# Patient Record
Sex: FEMALE | Race: WHITE | Employment: FULL TIME | URBAN - METROPOLITAN AREA
[De-identification: names, ages, dates, MRNs, and addresses within clinical notes are randomized per-mention and may not be internally consistent; named-entity substitution may affect disease eponyms.]

---

## 2017-01-03 ENCOUNTER — TELEPHONE (OUTPATIENT)
Dept: ENDOCRINOLOGY | Age: 47
End: 2017-01-03

## 2017-01-26 ENCOUNTER — OFFICE VISIT (OUTPATIENT)
Dept: INTERNAL MEDICINE CLINIC | Age: 47
End: 2017-01-26

## 2017-01-26 VITALS
RESPIRATION RATE: 16 BRPM | HEART RATE: 88 BPM | HEIGHT: 65 IN | BODY MASS INDEX: 33.15 KG/M2 | OXYGEN SATURATION: 98 % | DIASTOLIC BLOOD PRESSURE: 72 MMHG | SYSTOLIC BLOOD PRESSURE: 114 MMHG | WEIGHT: 199 LBS

## 2017-01-26 LAB
ANION GAP SERPL CALCULATED.3IONS-SCNC: 19 MMOL/L (ref 3–16)
BUN BLDV-MCNC: 13 MG/DL (ref 7–20)
CALCIUM SERPL-MCNC: 9.2 MG/DL (ref 8.3–10.6)
CHLORIDE BLD-SCNC: 99 MMOL/L (ref 99–110)
CO2: 23 MMOL/L (ref 21–32)
CREAT SERPL-MCNC: 0.6 MG/DL (ref 0.6–1.1)
CREATININE URINE: 135.2 MG/DL (ref 28–259)
GFR AFRICAN AMERICAN: >60
GFR NON-AFRICAN AMERICAN: >60
GLUCOSE BLD-MCNC: 114 MG/DL (ref 70–99)
HBA1C MFR BLD: 6.5 %
MICROALBUMIN UR-MCNC: <1.2 MG/DL
MICROALBUMIN/CREAT UR-RTO: NORMAL MG/G (ref 0–30)
POTASSIUM SERPL-SCNC: 4.3 MMOL/L (ref 3.5–5.1)
SODIUM BLD-SCNC: 141 MMOL/L (ref 136–145)

## 2017-01-26 PROCEDURE — 83036 HEMOGLOBIN GLYCOSYLATED A1C: CPT | Performed by: INTERNAL MEDICINE

## 2017-01-26 PROCEDURE — 90686 IIV4 VACC NO PRSV 0.5 ML IM: CPT | Performed by: INTERNAL MEDICINE

## 2017-01-26 PROCEDURE — 36415 COLL VENOUS BLD VENIPUNCTURE: CPT | Performed by: INTERNAL MEDICINE

## 2017-01-26 PROCEDURE — 99213 OFFICE O/P EST LOW 20 MIN: CPT | Performed by: INTERNAL MEDICINE

## 2017-01-26 PROCEDURE — 90471 IMMUNIZATION ADMIN: CPT | Performed by: INTERNAL MEDICINE

## 2017-01-26 ASSESSMENT — PATIENT HEALTH QUESTIONNAIRE - PHQ9
1. LITTLE INTEREST OR PLEASURE IN DOING THINGS: 0
SUM OF ALL RESPONSES TO PHQ QUESTIONS 1-9: 0
SUM OF ALL RESPONSES TO PHQ9 QUESTIONS 1 & 2: 0
2. FEELING DOWN, DEPRESSED OR HOPELESS: 0

## 2017-01-26 ASSESSMENT — ENCOUNTER SYMPTOMS
COUGH: 0
VISUAL CHANGE: 0
BLURRED VISION: 0
SHORTNESS OF BREATH: 0

## 2017-02-20 RX ORDER — METFORMIN HYDROCHLORIDE 500 MG/1
TABLET, EXTENDED RELEASE ORAL
Qty: 120 TABLET | Refills: 3 | Status: SHIPPED | OUTPATIENT
Start: 2017-02-20 | End: 2017-06-27 | Stop reason: SDUPTHER

## 2017-02-20 RX ORDER — EMPAGLIFLOZIN 10 MG/1
TABLET, FILM COATED ORAL
Qty: 30 TABLET | Refills: 3 | Status: SHIPPED | OUTPATIENT
Start: 2017-02-20 | End: 2017-06-27 | Stop reason: SDUPTHER

## 2017-04-13 ENCOUNTER — OFFICE VISIT (OUTPATIENT)
Dept: ENDOCRINOLOGY | Age: 47
End: 2017-04-13

## 2017-04-13 VITALS
HEIGHT: 64 IN | SYSTOLIC BLOOD PRESSURE: 122 MMHG | OXYGEN SATURATION: 97 % | HEART RATE: 94 BPM | BODY MASS INDEX: 33.29 KG/M2 | RESPIRATION RATE: 16 BRPM | DIASTOLIC BLOOD PRESSURE: 83 MMHG | WEIGHT: 195 LBS

## 2017-04-13 DIAGNOSIS — G62.9 NEUROPATHY: ICD-10-CM

## 2017-04-13 LAB — HBA1C MFR BLD: 6.2 %

## 2017-04-13 PROCEDURE — 83036 HEMOGLOBIN GLYCOSYLATED A1C: CPT | Performed by: INTERNAL MEDICINE

## 2017-04-13 PROCEDURE — 99214 OFFICE O/P EST MOD 30 MIN: CPT | Performed by: INTERNAL MEDICINE

## 2017-05-01 RX ORDER — INSULIN DEGLUDEC INJECTION 100 U/ML
INJECTION, SOLUTION SUBCUTANEOUS
Qty: 6 PEN | Refills: 3 | Status: SHIPPED | OUTPATIENT
Start: 2017-05-01 | End: 2017-07-27 | Stop reason: SDUPTHER

## 2017-06-27 RX ORDER — EMPAGLIFLOZIN 10 MG/1
TABLET, FILM COATED ORAL
Qty: 30 TABLET | Refills: 3 | Status: SHIPPED | OUTPATIENT
Start: 2017-06-27 | End: 2017-11-08 | Stop reason: SDUPTHER

## 2017-06-27 RX ORDER — METFORMIN HYDROCHLORIDE 500 MG/1
TABLET, EXTENDED RELEASE ORAL
Qty: 120 TABLET | Refills: 3 | Status: SHIPPED | OUTPATIENT
Start: 2017-06-27 | End: 2017-11-08 | Stop reason: SDUPTHER

## 2017-07-27 ENCOUNTER — OFFICE VISIT (OUTPATIENT)
Dept: INTERNAL MEDICINE CLINIC | Age: 47
End: 2017-07-27

## 2017-07-27 VITALS
SYSTOLIC BLOOD PRESSURE: 118 MMHG | RESPIRATION RATE: 16 BRPM | BODY MASS INDEX: 34.15 KG/M2 | HEIGHT: 64 IN | HEART RATE: 80 BPM | WEIGHT: 200 LBS | DIASTOLIC BLOOD PRESSURE: 76 MMHG

## 2017-07-27 LAB
ANION GAP SERPL CALCULATED.3IONS-SCNC: 17 MMOL/L (ref 3–16)
BUN BLDV-MCNC: 13 MG/DL (ref 7–20)
CALCIUM SERPL-MCNC: 9.4 MG/DL (ref 8.3–10.6)
CHLORIDE BLD-SCNC: 99 MMOL/L (ref 99–110)
CHOLESTEROL, TOTAL: 143 MG/DL (ref 0–199)
CO2: 24 MMOL/L (ref 21–32)
CREAT SERPL-MCNC: <0.5 MG/DL (ref 0.6–1.1)
GFR AFRICAN AMERICAN: >60
GFR NON-AFRICAN AMERICAN: >60
GLUCOSE BLD-MCNC: 104 MG/DL (ref 70–99)
HBA1C MFR BLD: 6.6 %
HDLC SERPL-MCNC: 51 MG/DL (ref 40–60)
LDL CHOLESTEROL CALCULATED: 61 MG/DL
POTASSIUM SERPL-SCNC: 4.2 MMOL/L (ref 3.5–5.1)
SODIUM BLD-SCNC: 140 MMOL/L (ref 136–145)
TRIGL SERPL-MCNC: 153 MG/DL (ref 0–150)
VLDLC SERPL CALC-MCNC: 31 MG/DL

## 2017-07-27 PROCEDURE — 83036 HEMOGLOBIN GLYCOSYLATED A1C: CPT | Performed by: INTERNAL MEDICINE

## 2017-07-27 PROCEDURE — 99213 OFFICE O/P EST LOW 20 MIN: CPT | Performed by: INTERNAL MEDICINE

## 2017-07-27 PROCEDURE — 36415 COLL VENOUS BLD VENIPUNCTURE: CPT | Performed by: INTERNAL MEDICINE

## 2017-07-27 ASSESSMENT — ENCOUNTER SYMPTOMS
VISUAL CHANGE: 0
COUGH: 0
BLURRED VISION: 0
SHORTNESS OF BREATH: 0

## 2017-08-14 ENCOUNTER — TELEPHONE (OUTPATIENT)
Dept: INTERNAL MEDICINE CLINIC | Age: 47
End: 2017-08-14

## 2017-10-02 ENCOUNTER — HOSPITAL ENCOUNTER (OUTPATIENT)
Dept: WOMENS IMAGING | Age: 47
Discharge: HOME | End: 2017-10-02
Attending: INTERNAL MEDICINE | Admitting: INTERNAL MEDICINE

## 2017-10-02 DIAGNOSIS — Z12.31 VISIT FOR SCREENING MAMMOGRAM: ICD-10-CM

## 2017-11-08 RX ORDER — EMPAGLIFLOZIN 10 MG/1
TABLET, FILM COATED ORAL
Qty: 30 TABLET | Refills: 0 | Status: SHIPPED | OUTPATIENT
Start: 2017-11-08 | End: 2017-12-06 | Stop reason: SDUPTHER

## 2017-11-08 RX ORDER — METFORMIN HYDROCHLORIDE 500 MG/1
TABLET, EXTENDED RELEASE ORAL
Qty: 120 TABLET | Refills: 0 | Status: SHIPPED | OUTPATIENT
Start: 2017-11-08 | End: 2017-12-06 | Stop reason: SDUPTHER

## 2017-11-20 ENCOUNTER — OFFICE VISIT (OUTPATIENT)
Dept: INTERNAL MEDICINE CLINIC | Age: 47
End: 2017-11-20

## 2017-11-20 VITALS
BODY MASS INDEX: 33.97 KG/M2 | HEART RATE: 68 BPM | SYSTOLIC BLOOD PRESSURE: 100 MMHG | RESPIRATION RATE: 16 BRPM | DIASTOLIC BLOOD PRESSURE: 70 MMHG | HEIGHT: 64 IN | WEIGHT: 199 LBS

## 2017-11-20 LAB — HBA1C MFR BLD: 6.6 %

## 2017-11-20 PROCEDURE — 90472 IMMUNIZATION ADMIN EACH ADD: CPT | Performed by: INTERNAL MEDICINE

## 2017-11-20 PROCEDURE — 90471 IMMUNIZATION ADMIN: CPT | Performed by: INTERNAL MEDICINE

## 2017-11-20 PROCEDURE — 83036 HEMOGLOBIN GLYCOSYLATED A1C: CPT | Performed by: INTERNAL MEDICINE

## 2017-11-20 PROCEDURE — 90686 IIV4 VACC NO PRSV 0.5 ML IM: CPT | Performed by: INTERNAL MEDICINE

## 2017-11-20 PROCEDURE — 90715 TDAP VACCINE 7 YRS/> IM: CPT | Performed by: INTERNAL MEDICINE

## 2017-11-20 PROCEDURE — 99213 OFFICE O/P EST LOW 20 MIN: CPT | Performed by: INTERNAL MEDICINE

## 2017-11-20 ASSESSMENT — ENCOUNTER SYMPTOMS
VISUAL CHANGE: 0
BLURRED VISION: 0
SHORTNESS OF BREATH: 0
COUGH: 0

## 2017-11-20 NOTE — PROGRESS NOTES
Vaccine Information Sheet, \"Influenza - Inactivated\"  given to Carolee Louise, or parent/legal guardian of  Carolee Louise and verbalized understanding. Patient responses:    Have you ever had a reaction to a flu vaccine? No  Are you able to eat eggs without adverse effects? Yes  Do you have any current illness? No  Have you ever had Guillian Olympia Syndrome? No    Flu vaccine given per order. Please see immunization tab.

## 2017-11-20 NOTE — PROGRESS NOTES
Neurological: Negative for syncope, weakness and light-headedness. Prior to Visit Medications    Medication Sig Taking? Authorizing Provider   JARDIANCE 10 MG tablet TAKE 1 TABLET BY MOUTH DAILY Yes Ac Montilla MD   metFORMIN (GLUCOPHAGE-XR) 500 MG extended release tablet TAKE 2 TABLETS BY MOUTH BEFORE BREAKFAST AND DINNER Yes Ac Montilla MD   Insulin Degludec (TRESIBA FLEXTOUCH) 100 UNIT/ML SOPN INJECT 44 UNITS PER DAY Yes Patsy Medellin MD   BD ULTRA-FINE PEN NEEDLES 29G X 12.7MM MISC TEST TWICE DAILY Yes Patsy Medellin MD   lisinopril (PRINIVIL;ZESTRIL) 10 MG tablet TAKE 1 TABLET BY MOUTH DAILY Yes Patsy Medellin MD   Multiple Vitamins-Minerals (MULTIPLE VITAMINS/WOMENS PO) Take  by mouth daily. Yes Historical Provider, MD   aspirin 81 MG tablet Take 81 mg by mouth daily. Yes Historical Provider, MD   glucose blood VI test strips (TRUETEST TEST) strip Pt tests bid Yes Patsy Medellin MD     /70 (Site: Right Arm, Position: Sitting, Cuff Size: Medium Adult)   Pulse 68   Resp 16   Ht 5' 4\" (1.626 m)   Wt 199 lb (90.3 kg)   BMI 34.16 kg/m²       Objective:   Physical Exam   Constitutional: She appears well-developed and well-nourished. No distress. Neck: No JVD present. No bruits   Cardiovascular: Normal rate, regular rhythm and normal heart sounds. Exam reveals no gallop and no friction rub. No murmur heard. Pulmonary/Chest: Effort normal and breath sounds normal. No respiratory distress. She has no wheezes. She has no rales. Abdominal: She exhibits no distension. There is no tenderness. There is no CVA tenderness. Musculoskeletal: She exhibits no edema. Skin: Skin is warm and dry. She is not diaphoretic. No erythema. Vitals reviewed. Assessment:      1. Diabetes mellitus, insulin dependent (IDDM), controlled ([facility] [address]            Plan:      Domingo Horner was seen today for diabetes.     Diagnoses and all orders for this visit:    Diabetes mellitus, insulin dependent (IDDM), controlled ([facility] [address]  -     POCT glycosylated hemoglobin (Hb A1C)    Other orders  -     INFLUENZA, QUADV, 3 YRS AND OLDER, IM, PF, PREFILL SYR OR SDV, 0.5ML (FLUZONE QUADV, PF)  -     Tdap (age 10y-63y) IM (ADACEL)        6 months

## 2017-12-07 RX ORDER — EMPAGLIFLOZIN 10 MG/1
TABLET, FILM COATED ORAL
Qty: 30 TABLET | Refills: 0 | Status: SHIPPED | OUTPATIENT
Start: 2017-12-07 | End: 2017-12-27

## 2017-12-07 RX ORDER — METFORMIN HYDROCHLORIDE 500 MG/1
TABLET, EXTENDED RELEASE ORAL
Qty: 120 TABLET | Refills: 0 | Status: SHIPPED | OUTPATIENT
Start: 2017-12-07 | End: 2017-12-30 | Stop reason: SDUPTHER

## 2018-01-02 RX ORDER — METFORMIN HYDROCHLORIDE 500 MG/1
TABLET, EXTENDED RELEASE ORAL
Qty: 120 TABLET | Refills: 3 | Status: SHIPPED | OUTPATIENT
Start: 2018-01-02 | End: 2018-05-12 | Stop reason: SDUPTHER

## 2018-01-02 RX ORDER — EMPAGLIFLOZIN 10 MG/1
TABLET, FILM COATED ORAL
Qty: 30 TABLET | Refills: 3 | Status: SHIPPED | OUTPATIENT
Start: 2018-01-02 | End: 2018-01-10

## 2018-01-10 ENCOUNTER — OFFICE VISIT (OUTPATIENT)
Dept: ENDOCRINOLOGY | Age: 48
End: 2018-01-10

## 2018-01-10 VITALS
HEART RATE: 117 BPM | DIASTOLIC BLOOD PRESSURE: 79 MMHG | RESPIRATION RATE: 16 BRPM | BODY MASS INDEX: 33.67 KG/M2 | HEIGHT: 64 IN | WEIGHT: 197.2 LBS | SYSTOLIC BLOOD PRESSURE: 114 MMHG | OXYGEN SATURATION: 99 %

## 2018-01-10 DIAGNOSIS — G62.9 NEUROPATHY: ICD-10-CM

## 2018-01-10 LAB
CREATININE URINE: 66.9 MG/DL (ref 28–259)
HBA1C MFR BLD: 6.3 %
MICROALBUMIN UR-MCNC: <1.2 MG/DL
MICROALBUMIN/CREAT UR-RTO: NORMAL MG/G (ref 0–30)

## 2018-01-10 PROCEDURE — 83036 HEMOGLOBIN GLYCOSYLATED A1C: CPT | Performed by: INTERNAL MEDICINE

## 2018-01-10 PROCEDURE — 99214 OFFICE O/P EST MOD 30 MIN: CPT | Performed by: INTERNAL MEDICINE

## 2018-01-16 RX ORDER — INSULIN DEGLUDEC INJECTION 100 U/ML
INJECTION, SOLUTION SUBCUTANEOUS
Qty: 1 PEN | Refills: 12 | Status: SHIPPED | OUTPATIENT
Start: 2018-01-16 | End: 2019-02-11 | Stop reason: SDUPTHER

## 2018-03-12 RX ORDER — LISINOPRIL 10 MG/1
TABLET ORAL
Qty: 30 TABLET | Refills: 12 | Status: SHIPPED | OUTPATIENT
Start: 2018-03-12 | End: 2019-03-25 | Stop reason: SDUPTHER

## 2018-04-10 ENCOUNTER — PATIENT MESSAGE (OUTPATIENT)
Dept: INTERNAL MEDICINE CLINIC | Age: 48
End: 2018-04-10

## 2018-05-14 RX ORDER — METFORMIN HYDROCHLORIDE 500 MG/1
TABLET, EXTENDED RELEASE ORAL
Qty: 120 TABLET | Refills: 3 | Status: SHIPPED | OUTPATIENT
Start: 2018-05-14 | End: 2018-10-11 | Stop reason: SDUPTHER

## 2018-05-21 ENCOUNTER — OFFICE VISIT (OUTPATIENT)
Dept: INTERNAL MEDICINE CLINIC | Age: 48
End: 2018-05-21

## 2018-05-21 VITALS
RESPIRATION RATE: 16 BRPM | WEIGHT: 200 LBS | DIASTOLIC BLOOD PRESSURE: 74 MMHG | HEIGHT: 64 IN | HEART RATE: 80 BPM | SYSTOLIC BLOOD PRESSURE: 108 MMHG | BODY MASS INDEX: 34.15 KG/M2

## 2018-05-21 LAB
ANION GAP SERPL CALCULATED.3IONS-SCNC: 15 MMOL/L (ref 3–16)
BUN BLDV-MCNC: 10 MG/DL (ref 7–20)
CALCIUM SERPL-MCNC: 9 MG/DL (ref 8.3–10.6)
CHLORIDE BLD-SCNC: 100 MMOL/L (ref 99–110)
CHOLESTEROL, TOTAL: 143 MG/DL (ref 0–199)
CO2: 24 MMOL/L (ref 21–32)
CREAT SERPL-MCNC: <0.5 MG/DL (ref 0.6–1.1)
GFR AFRICAN AMERICAN: >60
GFR NON-AFRICAN AMERICAN: >60
GLUCOSE BLD-MCNC: 128 MG/DL (ref 70–99)
HBA1C MFR BLD: 6.6 %
HDLC SERPL-MCNC: 45 MG/DL (ref 40–60)
LDL CHOLESTEROL CALCULATED: 66 MG/DL
POTASSIUM SERPL-SCNC: 4.5 MMOL/L (ref 3.5–5.1)
SODIUM BLD-SCNC: 139 MMOL/L (ref 136–145)
TRIGL SERPL-MCNC: 161 MG/DL (ref 0–150)
TSH REFLEX: 1.77 UIU/ML (ref 0.27–4.2)
VLDLC SERPL CALC-MCNC: 32 MG/DL

## 2018-05-21 PROCEDURE — 36415 COLL VENOUS BLD VENIPUNCTURE: CPT | Performed by: INTERNAL MEDICINE

## 2018-05-21 PROCEDURE — 99214 OFFICE O/P EST MOD 30 MIN: CPT | Performed by: INTERNAL MEDICINE

## 2018-05-21 PROCEDURE — 83036 HEMOGLOBIN GLYCOSYLATED A1C: CPT | Performed by: INTERNAL MEDICINE

## 2018-05-21 ASSESSMENT — ENCOUNTER SYMPTOMS
VISUAL CHANGE: 0
BLURRED VISION: 0
COUGH: 0
SHORTNESS OF BREATH: 0

## 2018-08-16 ENCOUNTER — OFFICE VISIT (OUTPATIENT)
Dept: ENDOCRINOLOGY | Age: 48
End: 2018-08-16

## 2018-08-16 VITALS
HEIGHT: 64 IN | DIASTOLIC BLOOD PRESSURE: 68 MMHG | BODY MASS INDEX: 33.29 KG/M2 | WEIGHT: 195 LBS | SYSTOLIC BLOOD PRESSURE: 116 MMHG

## 2018-08-16 DIAGNOSIS — G62.9 NEUROPATHY: ICD-10-CM

## 2018-08-16 DIAGNOSIS — E11.9 DIABETES MELLITUS WITHOUT COMPLICATION (HCC): Primary | ICD-10-CM

## 2018-08-16 LAB — HBA1C MFR BLD: 6.4 %

## 2018-08-16 PROCEDURE — 99214 OFFICE O/P EST MOD 30 MIN: CPT | Performed by: INTERNAL MEDICINE

## 2018-08-16 PROCEDURE — 83036 HEMOGLOBIN GLYCOSYLATED A1C: CPT | Performed by: INTERNAL MEDICINE

## 2018-08-16 NOTE — PROGRESS NOTES
Seen as f/u patient for diabetes    Interim: On tresiba now due to insurance    Diagnosed with Type 2 diabetes mellitus in  2010  Known diabetic complications: neuropathy    Current diabetic medications   Metformin ER 2 BID  Tresiba 44 units    [place]    She has tried Larisa Lemon in the past.    Intolerance to diabetes medications: yes - Metformin plain form    Last A1c 6.4%<----- 6.3%<---- 6.2% on 4/17<---- 6.4 on 4/16<------5.8 on 9/15<---- 5.7 on 3/15<---6.0 on 8/14<-----6.5 on 2/14<---8.0 on 11/13<---9.5 on 7/13<--- 9.0<--- 6.5    Prior visit with dietician: Yes   Current diet: on average, 3 meals per day   Current exercise: walking   Current monitoring regimen: home blood tests -occ    Has brought blood glucose log/meter: no  Home blood sugar records: 100s  Any episodes of hypoglycemia? occ    No Hx of CAD , PVD, CVA    Hyperlipidemia: LDL 68 o n 7/13---> LDL 66 on 5/18  Last eye exam:6/17  Last foot exam: 8/18  Last microalbumin to creatinine ratio: 1/18  ACE-I or ARB: Lisinopril 10mg    Neuropathy improved, stable    Review of Systems  scanned  Reviewed  5lb loss     Objective:        /68 (Site: Right Arm, Position: Sitting, Cuff Size: Medium Adult)   Ht 5' 4\" (1.626 m)   Wt 195 lb (88.5 kg)   LMP 08/04/2018   Breastfeeding? No   BMI 33.47 kg/m²   Wt Readings from Last 3 Encounters:   08/16/18 195 lb (88.5 kg)   05/21/18 200 lb (90.7 kg)   01/10/18 197 lb 3.2 oz (89.4 kg)     Constitutional: Well-developed, appears stated age, cooperative, in no acute distress  H/E/N/M/T:atraumatic, normocephalic, external ears, nose, lips normal without lesions  Eyes: Lids, lashes, conjunctivae and sclerae normal, No proptosis, no redness  Neck: supple, symmetrical, no swelling  Skin: No obvious rashes or lesions present.   Skin and hair texture normal  Psychiatric: Judgement and Insight:  judgement and insight appear normal  Neuro: Normal without focal findings, speech is normal normal, speech is

## 2018-09-04 RX ORDER — DAPAGLIFLOZIN 5 MG/1
5 TABLET, FILM COATED ORAL EVERY MORNING
Qty: 30 TABLET | Refills: 3 | Status: SHIPPED | OUTPATIENT
Start: 2018-09-04 | End: 2019-01-16 | Stop reason: SDUPTHER

## 2018-10-11 RX ORDER — METFORMIN HYDROCHLORIDE 500 MG/1
TABLET, EXTENDED RELEASE ORAL
Qty: 120 TABLET | Refills: 3 | Status: SHIPPED | OUTPATIENT
Start: 2018-10-11 | End: 2019-03-13 | Stop reason: SDUPTHER

## 2018-11-26 ENCOUNTER — OFFICE VISIT (OUTPATIENT)
Dept: INTERNAL MEDICINE CLINIC | Age: 48
End: 2018-11-26
Payer: COMMERCIAL

## 2018-11-26 VITALS
RESPIRATION RATE: 16 BRPM | SYSTOLIC BLOOD PRESSURE: 118 MMHG | DIASTOLIC BLOOD PRESSURE: 74 MMHG | HEART RATE: 72 BPM | BODY MASS INDEX: 33.63 KG/M2 | HEIGHT: 64 IN | WEIGHT: 197 LBS

## 2018-11-26 LAB
A/G RATIO: 1.7 (ref 1.1–2.2)
ALBUMIN SERPL-MCNC: 4.3 G/DL (ref 3.4–5)
ALP BLD-CCNC: 75 U/L (ref 40–129)
ALT SERPL-CCNC: 31 U/L (ref 10–40)
ANION GAP SERPL CALCULATED.3IONS-SCNC: 12 MMOL/L (ref 3–16)
AST SERPL-CCNC: 20 U/L (ref 15–37)
BILIRUB SERPL-MCNC: 0.4 MG/DL (ref 0–1)
BUN BLDV-MCNC: 10 MG/DL (ref 7–20)
CALCIUM SERPL-MCNC: 9.6 MG/DL (ref 8.3–10.6)
CHLORIDE BLD-SCNC: 103 MMOL/L (ref 99–110)
CHOLESTEROL, TOTAL: 145 MG/DL (ref 0–199)
CO2: 23 MMOL/L (ref 21–32)
CREAT SERPL-MCNC: <0.5 MG/DL (ref 0.6–1.1)
GFR AFRICAN AMERICAN: >60
GFR NON-AFRICAN AMERICAN: >60
GLOBULIN: 2.5 G/DL
GLUCOSE BLD-MCNC: 122 MG/DL (ref 70–99)
HBA1C MFR BLD: 6.9 %
HDLC SERPL-MCNC: 49 MG/DL (ref 40–60)
LDL CHOLESTEROL CALCULATED: 66 MG/DL
POTASSIUM SERPL-SCNC: 4.1 MMOL/L (ref 3.5–5.1)
SODIUM BLD-SCNC: 138 MMOL/L (ref 136–145)
TOTAL PROTEIN: 6.8 G/DL (ref 6.4–8.2)
TRIGL SERPL-MCNC: 150 MG/DL (ref 0–150)
VLDLC SERPL CALC-MCNC: 30 MG/DL

## 2018-11-26 PROCEDURE — 36415 COLL VENOUS BLD VENIPUNCTURE: CPT | Performed by: INTERNAL MEDICINE

## 2018-11-26 PROCEDURE — 99213 OFFICE O/P EST LOW 20 MIN: CPT | Performed by: INTERNAL MEDICINE

## 2018-11-26 PROCEDURE — 90471 IMMUNIZATION ADMIN: CPT | Performed by: INTERNAL MEDICINE

## 2018-11-26 PROCEDURE — 83036 HEMOGLOBIN GLYCOSYLATED A1C: CPT | Performed by: INTERNAL MEDICINE

## 2018-11-26 PROCEDURE — 90686 IIV4 VACC NO PRSV 0.5 ML IM: CPT | Performed by: INTERNAL MEDICINE

## 2018-11-26 ASSESSMENT — PATIENT HEALTH QUESTIONNAIRE - PHQ9
SUM OF ALL RESPONSES TO PHQ QUESTIONS 1-9: 0
1. LITTLE INTEREST OR PLEASURE IN DOING THINGS: 0
2. FEELING DOWN, DEPRESSED OR HOPELESS: 0
SUM OF ALL RESPONSES TO PHQ9 QUESTIONS 1 & 2: 0
SUM OF ALL RESPONSES TO PHQ QUESTIONS 1-9: 0

## 2018-11-26 ASSESSMENT — ENCOUNTER SYMPTOMS
COUGH: 0
SHORTNESS OF BREATH: 0
BLURRED VISION: 0
VISUAL CHANGE: 0

## 2018-11-26 NOTE — PROGRESS NOTES
Subjective:    cc:  Dm f/u  Patient ID: Laurent Irving is a 53 y.o. female. Diabetes   She presents for her follow-up diabetic visit. She has type 2 diabetes mellitus. No MedicAlert identification noted. Her disease course has been stable (endocrinologist cut back on lantus dose as last a1c was just 6). There are no hypoglycemic associated symptoms. There are no diabetic associated symptoms. Pertinent negatives for diabetes include no blurred vision, no chest pain, no fatigue, no foot ulcerations, no polydipsia, no polyphagia, no polyuria, no visual change, no weakness and no weight loss. There are no hypoglycemic complications. Symptoms are stable. There are no diabetic complications. Risk factors for coronary artery disease include diabetes mellitus. Current diabetic treatment includes diet, insulin injections, intensive insulin program and oral agent (dual therapy). She is compliant with treatment all of the time. She is currently taking insulin at bedtime and pre-breakfast. Insulin injections are given by patient. Her weight is fluctuating minimally. She is following a generally healthy diet. Meal planning includes carbohydrate counting. She has had a previous visit with a dietitian. She participates in exercise intermittently. There is no compliance with monitoring of blood glucose. An ACE inhibitor/angiotensin II receptor blocker is being taken. She does not see a podiatrist.Eye exam is current. seeing endocrine q6 months. Alternating between me and him. Dm has been under good control. Last a1c 6.4. Minimal exercise but active at work. Review of Systems   Constitutional: Negative for fatigue, unexpected weight change and weight loss. Eyes: Negative for blurred vision. Respiratory: Negative for cough and shortness of breath. Cardiovascular: Negative for chest pain, palpitations and leg swelling. Endocrine: Negative for polydipsia, polyphagia and polyuria.    Neurological:

## 2019-01-16 RX ORDER — DAPAGLIFLOZIN 5 MG/1
5 TABLET, FILM COATED ORAL EVERY MORNING
Qty: 30 TABLET | Refills: 3 | Status: SHIPPED | OUTPATIENT
Start: 2019-01-16 | End: 2019-05-21 | Stop reason: SDUPTHER

## 2019-01-31 ENCOUNTER — OFFICE VISIT (OUTPATIENT)
Dept: INTERNAL MEDICINE CLINIC | Age: 49
End: 2019-01-31
Payer: COMMERCIAL

## 2019-01-31 ENCOUNTER — PATIENT MESSAGE (OUTPATIENT)
Dept: INTERNAL MEDICINE CLINIC | Age: 49
End: 2019-01-31

## 2019-01-31 VITALS
HEART RATE: 124 BPM | HEIGHT: 64 IN | DIASTOLIC BLOOD PRESSURE: 80 MMHG | TEMPERATURE: 98.7 F | OXYGEN SATURATION: 98 % | WEIGHT: 194 LBS | SYSTOLIC BLOOD PRESSURE: 120 MMHG | BODY MASS INDEX: 33.12 KG/M2 | RESPIRATION RATE: 14 BRPM

## 2019-01-31 DIAGNOSIS — J18.9 COMMUNITY ACQUIRED PNEUMONIA, UNSPECIFIED LATERALITY: Primary | ICD-10-CM

## 2019-01-31 DIAGNOSIS — R05.9 COUGH: ICD-10-CM

## 2019-01-31 PROCEDURE — 99213 OFFICE O/P EST LOW 20 MIN: CPT | Performed by: NURSE PRACTITIONER

## 2019-01-31 RX ORDER — BENZONATATE 100 MG/1
100 CAPSULE ORAL 3 TIMES DAILY PRN
Qty: 20 CAPSULE | Refills: 0 | Status: SHIPPED | OUTPATIENT
Start: 2019-01-31 | End: 2019-01-31 | Stop reason: CLARIF

## 2019-01-31 RX ORDER — BENZONATATE 100 MG/1
100 CAPSULE ORAL 3 TIMES DAILY PRN
Qty: 20 CAPSULE | Refills: 0 | Status: SHIPPED | OUTPATIENT
Start: 2019-01-31 | End: 2019-02-07

## 2019-01-31 RX ORDER — AZITHROMYCIN 250 MG/1
TABLET, FILM COATED ORAL
Refills: 0 | COMMUNITY
Start: 2019-01-29 | End: 2019-01-31

## 2019-01-31 RX ORDER — DEXTROMETHORPHAN HYDROBROMIDE AND PROMETHAZINE HYDROCHLORIDE 15; 6.25 MG/5ML; MG/5ML
SYRUP ORAL
Refills: 0 | COMMUNITY
Start: 2019-01-29 | End: 2019-04-16

## 2019-01-31 RX ORDER — LEVOFLOXACIN 500 MG/1
500 TABLET, FILM COATED ORAL DAILY
Qty: 10 TABLET | Refills: 0 | Status: SHIPPED | OUTPATIENT
Start: 2019-01-31 | End: 2019-02-10

## 2019-01-31 ASSESSMENT — ENCOUNTER SYMPTOMS
ANAL BLEEDING: 0
SORE THROAT: 1
BACK PAIN: 0
CHOKING: 0
SINUS PAIN: 1
SINUS PRESSURE: 1
FREQUENT THROAT CLEARING: 0
HEMOPTYSIS: 0
CHEST TIGHTNESS: 0
TROUBLE SWALLOWING: 0
STRIDOR: 0
VOMITING: 0
ABDOMINAL PAIN: 0
SHORTNESS OF BREATH: 0
COUGH: 1
DIARRHEA: 0
HOARSE VOICE: 0
NAUSEA: 0
ABDOMINAL DISTENTION: 0
SPUTUM PRODUCTION: 1
HEARTBURN: 0
BLOOD IN STOOL: 0
WHEEZING: 0
CONSTIPATION: 0
DIFFICULTY BREATHING: 1
RHINORRHEA: 1

## 2019-02-12 RX ORDER — INSULIN DEGLUDEC INJECTION 100 U/ML
INJECTION, SOLUTION SUBCUTANEOUS
Qty: 15 PEN | Refills: 12 | Status: SHIPPED | OUTPATIENT
Start: 2019-02-12 | End: 2019-07-22 | Stop reason: SDUPTHER

## 2019-02-21 ENCOUNTER — OFFICE VISIT (OUTPATIENT)
Dept: ENDOCRINOLOGY | Age: 49
End: 2019-02-21
Payer: COMMERCIAL

## 2019-02-21 VITALS
DIASTOLIC BLOOD PRESSURE: 80 MMHG | HEIGHT: 64 IN | WEIGHT: 192.8 LBS | SYSTOLIC BLOOD PRESSURE: 112 MMHG | BODY MASS INDEX: 32.91 KG/M2

## 2019-02-21 LAB
CREATININE URINE: 185.5 MG/DL (ref 28–259)
HBA1C MFR BLD: 6.6 %
MICROALBUMIN UR-MCNC: <1.2 MG/DL
MICROALBUMIN/CREAT UR-RTO: NORMAL MG/G (ref 0–30)

## 2019-02-21 PROCEDURE — 83036 HEMOGLOBIN GLYCOSYLATED A1C: CPT | Performed by: INTERNAL MEDICINE

## 2019-02-21 PROCEDURE — 99213 OFFICE O/P EST LOW 20 MIN: CPT | Performed by: INTERNAL MEDICINE

## 2019-02-21 RX ORDER — LANCETS
EACH MISCELLANEOUS
Qty: 100 EACH | Refills: 2 | Status: SHIPPED | OUTPATIENT
Start: 2019-02-21

## 2019-03-14 RX ORDER — METFORMIN HYDROCHLORIDE 500 MG/1
TABLET, EXTENDED RELEASE ORAL
Qty: 120 TABLET | Refills: 3 | Status: SHIPPED | OUTPATIENT
Start: 2019-03-14 | End: 2019-08-28 | Stop reason: SDUPTHER

## 2019-03-25 RX ORDER — LISINOPRIL 10 MG/1
TABLET ORAL
Qty: 30 TABLET | Refills: 12 | Status: SHIPPED | OUTPATIENT
Start: 2019-03-25 | End: 2020-04-03

## 2019-04-16 ENCOUNTER — OFFICE VISIT (OUTPATIENT)
Dept: INTERNAL MEDICINE CLINIC | Age: 49
End: 2019-04-16
Payer: COMMERCIAL

## 2019-04-16 VITALS
BODY MASS INDEX: 30.73 KG/M2 | WEIGHT: 180 LBS | HEIGHT: 64 IN | DIASTOLIC BLOOD PRESSURE: 80 MMHG | HEART RATE: 106 BPM | RESPIRATION RATE: 16 BRPM | SYSTOLIC BLOOD PRESSURE: 127 MMHG | TEMPERATURE: 98.5 F

## 2019-04-16 DIAGNOSIS — J20.9 BRONCHITIS WITH BRONCHOSPASM: Primary | ICD-10-CM

## 2019-04-16 PROCEDURE — 99213 OFFICE O/P EST LOW 20 MIN: CPT | Performed by: NURSE PRACTITIONER

## 2019-04-16 RX ORDER — GREEN TEA/HOODIA GORDONII 315-12.5MG
1 CAPSULE ORAL DAILY
Qty: 15 TABLET | Refills: 0 | Status: SHIPPED | OUTPATIENT
Start: 2019-04-16 | End: 2019-05-01

## 2019-04-16 RX ORDER — LEVOFLOXACIN 500 MG/1
500 TABLET, FILM COATED ORAL DAILY
Qty: 10 TABLET | Refills: 0 | Status: SHIPPED | OUTPATIENT
Start: 2019-04-16 | End: 2019-04-26

## 2019-04-16 RX ORDER — METHYLPREDNISOLONE 4 MG/1
TABLET ORAL
Qty: 1 KIT | Refills: 0 | Status: SHIPPED | OUTPATIENT
Start: 2019-04-16 | End: 2019-04-22

## 2019-04-16 ASSESSMENT — ENCOUNTER SYMPTOMS
SHORTNESS OF BREATH: 0
SORE THROAT: 0
RHINORRHEA: 1
SINUS PRESSURE: 1
WHEEZING: 1
VOMITING: 0
COUGH: 1
CHEST TIGHTNESS: 0
SWOLLEN GLANDS: 0
CHOKING: 0
DIARRHEA: 0
ABDOMINAL PAIN: 0
SINUS PAIN: 1
NAUSEA: 0

## 2019-04-16 NOTE — PROGRESS NOTES
Pt is here for: URI    Chief Complaint   Patient presents with    Cough     x 10 days       URI    This is a recurrent problem. The current episode started 1 to 4 weeks ago. The problem has been waxing and waning. The maximum temperature recorded prior to her arrival was 100.4 - 100.9 F. Associated symptoms include congestion, coughing, rhinorrhea, sinus pain and wheezing. Pertinent negatives include no abdominal pain, chest pain, diarrhea, dysuria, ear pain, headaches, joint pain, joint swelling, nausea, neck pain, plugged ear sensation, rash, sneezing, sore throat, swollen glands or vomiting. She has tried inhaler use (patient treated for similar s/s ~3 months ago, OP fail of zpack, treated with levoquin/tessalon perles at that time with reported improvement) for the symptoms. The treatment provided mild relief. /80 (Site: Right Upper Arm, Position: Sitting, Cuff Size: Large Adult)   Pulse 106   Temp 98.5 °F (36.9 °C) (Oral)   Resp 16   Ht 5' 4\" (1.626 m)   Wt 180 lb (81.6 kg)   BMI 30.90 kg/m²       Past Medical History:   Diagnosis Date    Chronic diarrhea     Depression     Insomnia     Type II or unspecified type diabetes mellitus without mention of complication, not stated as uncontrolled        Past Surgical History:   Procedure Laterality Date    BREAST REDUCTION SURGERY  1990    GALLBLADDER SURGERY  1998       Allergies   Allergen Reactions    Ace Inhibitors     Metformin And Related [Metformin And Related]      Diarrhea- no issues with extended release       Outpatient Medications Marked as Taking for the 4/16/19 encounter (Office Visit) with Fronie Lefort, APRN - CNP   Medication Sig Dispense Refill    levofloxacin (LEVAQUIN) 500 MG tablet Take 1 tablet by mouth daily for 10 days 10 tablet 0    methylPREDNISolone (MEDROL DOSEPACK) 4 MG tablet Take by mouth.  1 kit 0    Lactobacillus (PROBIOTIC ACIDOPHILUS) TABS Take 1 tablet by mouth daily for 15 days 15 tablet 0    activity: Not on file   Other Topics Concern    Not on file   Social History Narrative    Not on file       Review of Systems   Constitutional: Positive for fatigue. Negative for activity change, appetite change and fever. HENT: Positive for congestion, rhinorrhea, sinus pressure and sinus pain. Negative for ear pain, mouth sores, nosebleeds, postnasal drip, sneezing and sore throat. Respiratory: Positive for cough and wheezing. Negative for choking, chest tightness and shortness of breath. Cardiovascular: Negative for chest pain. Gastrointestinal: Negative for abdominal pain, diarrhea, nausea and vomiting. Genitourinary: Negative for dysuria. Musculoskeletal: Negative for joint pain and neck pain. Skin: Negative for rash. Neurological: Negative for headaches. Hematological: Negative for adenopathy. Physical Exam   Nursing note reviewed  /80 (Site: Right Upper Arm, Position: Sitting, Cuff Size: Large Adult)   Pulse 106   Temp 98.5 °F (36.9 °C) (Oral)   Resp 16   Ht 5' 4\" (1.626 m)   Wt 180 lb (81.6 kg)   BMI 30.90 kg/m²   BP Readings from Last 3 Encounters:   04/16/19 127/80   02/21/19 112/80   01/31/19 120/80     Wt Readings from Last 3 Encounters:   04/16/19 180 lb (81.6 kg)   02/21/19 192 lb 12.8 oz (87.5 kg)   01/31/19 194 lb (88 kg)      Body mass index is 30.9 kg/m². No results found for this visit on 04/16/19. Lab Review   Office Visit on 02/21/2019   Component Date Value    Hemoglobin A1C 02/21/2019 6.6     Microalbumin, Random Uri* 02/21/2019 <1.20     Creatinine, Ur 02/21/2019 185.5     Microalbumin Creatinine * 02/21/2019 see below          Physical Exam   Constitutional: She is oriented to person, place, and time. She appears well-developed. HENT:   Right Ear: No tenderness. Tympanic membrane is not erythematous. Left Ear: No tenderness. Tympanic membrane is not erythematous.    Nose: Right sinus exhibits maxillary sinus tenderness and frontal sinus

## 2019-05-22 RX ORDER — DAPAGLIFLOZIN 5 MG/1
5 TABLET, FILM COATED ORAL EVERY MORNING
Qty: 30 TABLET | Refills: 3 | Status: SHIPPED | OUTPATIENT
Start: 2019-05-22 | End: 2019-09-03

## 2019-06-05 ENCOUNTER — OFFICE VISIT (OUTPATIENT)
Dept: INTERNAL MEDICINE CLINIC | Age: 49
End: 2019-06-05
Payer: COMMERCIAL

## 2019-06-05 VITALS
SYSTOLIC BLOOD PRESSURE: 124 MMHG | OXYGEN SATURATION: 98 % | BODY MASS INDEX: 33.47 KG/M2 | WEIGHT: 195 LBS | HEART RATE: 84 BPM | DIASTOLIC BLOOD PRESSURE: 80 MMHG

## 2019-06-05 DIAGNOSIS — R13.10 DYSPHAGIA, UNSPECIFIED TYPE: ICD-10-CM

## 2019-06-05 LAB
A/G RATIO: 1.6 (ref 1.1–2.2)
ALBUMIN SERPL-MCNC: 4.5 G/DL (ref 3.4–5)
ALP BLD-CCNC: 82 U/L (ref 40–129)
ALT SERPL-CCNC: 22 U/L (ref 10–40)
ANION GAP SERPL CALCULATED.3IONS-SCNC: 20 MMOL/L (ref 3–16)
AST SERPL-CCNC: 18 U/L (ref 15–37)
BILIRUB SERPL-MCNC: 0.3 MG/DL (ref 0–1)
BUN BLDV-MCNC: 13 MG/DL (ref 7–20)
CALCIUM SERPL-MCNC: 9.7 MG/DL (ref 8.3–10.6)
CHLORIDE BLD-SCNC: 101 MMOL/L (ref 99–110)
CHOLESTEROL, TOTAL: 155 MG/DL (ref 0–199)
CO2: 20 MMOL/L (ref 21–32)
CREAT SERPL-MCNC: 0.6 MG/DL (ref 0.6–1.1)
GFR AFRICAN AMERICAN: >60
GFR NON-AFRICAN AMERICAN: >60
GLOBULIN: 2.9 G/DL
GLUCOSE BLD-MCNC: 110 MG/DL (ref 70–99)
HBA1C MFR BLD: 6.8 %
HDLC SERPL-MCNC: 54 MG/DL (ref 40–60)
LDL CHOLESTEROL CALCULATED: 70 MG/DL
POTASSIUM SERPL-SCNC: 4.6 MMOL/L (ref 3.5–5.1)
SODIUM BLD-SCNC: 141 MMOL/L (ref 136–145)
TOTAL PROTEIN: 7.4 G/DL (ref 6.4–8.2)
TRIGL SERPL-MCNC: 155 MG/DL (ref 0–150)
TSH SERPL DL<=0.05 MIU/L-ACNC: 1.85 UIU/ML (ref 0.27–4.2)
VLDLC SERPL CALC-MCNC: 31 MG/DL

## 2019-06-05 PROCEDURE — 99213 OFFICE O/P EST LOW 20 MIN: CPT | Performed by: INTERNAL MEDICINE

## 2019-06-05 PROCEDURE — 83036 HEMOGLOBIN GLYCOSYLATED A1C: CPT | Performed by: INTERNAL MEDICINE

## 2019-06-05 ASSESSMENT — ENCOUNTER SYMPTOMS
BLURRED VISION: 0
VISUAL CHANGE: 0
COUGH: 0
SHORTNESS OF BREATH: 0

## 2019-06-05 NOTE — PROGRESS NOTES
Subjective:    cc:  Dm f/u, c/o food sticking  Patient ID: William Roldan is a 51 y.o. female. Diabetes   She presents for her follow-up diabetic visit. She has type 2 diabetes mellitus. No MedicAlert identification noted. Her disease course has been stable (endocrinologist cut back on lantus dose as last a1c was just 6). There are no hypoglycemic associated symptoms. There are no diabetic associated symptoms. Pertinent negatives for diabetes include no blurred vision, no chest pain, no fatigue, no foot ulcerations, no polydipsia, no polyphagia, no polyuria, no visual change, no weakness and no weight loss. There are no hypoglycemic complications. Symptoms are stable. There are no diabetic complications. Risk factors for coronary artery disease include diabetes mellitus. Current diabetic treatment includes diet, insulin injections, intensive insulin program and oral agent (dual therapy). She is compliant with treatment all of the time. She is currently taking insulin at bedtime and pre-breakfast. Insulin injections are given by patient. Her weight is fluctuating minimally. She is following a generally healthy diet. Meal planning includes carbohydrate counting. She has had a previous visit with a dietitian. She participates in exercise intermittently. There is no compliance with monitoring of blood glucose. An ACE inhibitor/angiotensin II receptor blocker is being taken. She does not see a podiatrist.Eye exam is current. seeing endocrine q6 months. Alternating between me and him. Dm has been under good control. Last a1c 6.4. Minimal exercise but active at work. C/o dysphagia. Initially only with rice but now occurring with multiple foods. Denies heartburn. Review of Systems   Constitutional: Negative for fatigue, unexpected weight change and weight loss. Eyes: Negative for blurred vision. Respiratory: Negative for cough and shortness of breath.     Cardiovascular: Negative for chest pain, palpitations and leg swelling. Endocrine: Negative for polydipsia, polyphagia and polyuria. Neurological: Negative for syncope, weakness and light-headedness. Prior to Visit Medications    Medication Sig Taking? Authorizing Provider   B-D ULTRAFINE III SHORT PEN 31G X 8 MM MISC USE AS DIRECTED DAILY Yes Tiffanie Lynch, APRN - CNP   FARXIGA 5 MG tablet TAKE 1 TABLET BY MOUTH EVERY MORNING Yes Shorty Herrera MD   lisinopril (PRINIVIL;ZESTRIL) 10 MG tablet TAKE 1 TABLET BY MOUTH DAILY Yes Bhargavi Glynn MD   metFORMIN (GLUCOPHAGE-XR) 500 MG extended release tablet TAKE 2 TABLETS BY MOUTH BEFORE Gwynda Citrin Yes Shorty Herrera MD   blood glucose test strips (ACCU-CHEK LUCIE) strip 1 each by In Vitro route daily As needed. Yes Shorty Herrera MD   ACCU-CHEK FASTCLIX LANCETS MISC 1-2 times a day Yes Shorty Herrera MD   TRESIBA FLEXTOUCH 100 UNIT/ML SOPN INJECT [place] Yes Bhargavi Glynn MD   Multiple Vitamins-Minerals (MULTIPLE VITAMINS/WOMENS PO) Take  by mouth daily. Yes Historical Provider, MD   aspirin 81 MG tablet Take 81 mg by mouth daily. Yes Historical Provider, MD   glucose blood VI test strips (TRUETEST TEST) strip Pt tests bid Yes Bhargavi Glynn MD     /80 (Site: Right Upper Arm, Position: Sitting, Cuff Size: Medium Adult)   Pulse 84   Wt 195 lb (88.5 kg)   SpO2 98%   BMI 33.47 kg/m²       Objective:   Physical Exam   Constitutional: She appears well-developed and well-nourished. No distress. Neck: No JVD present. No bruits   Cardiovascular: Normal rate, regular rhythm, normal heart sounds and intact distal pulses. Exam reveals no gallop and no friction rub. No murmur heard. Pulmonary/Chest: Effort normal and breath sounds normal. No respiratory distress. She has no wheezes. She has no rales. Abdominal: She exhibits no distension. There is no tenderness. There is no CVA tenderness. Musculoskeletal: She exhibits no edema. Skin: Skin is warm and dry. She is not diaphoretic. No erythema. Vitals reviewed. Assessment:      1. Diabetes mellitus, insulin dependent (IDDM), controlled ([address]    2. Dysphagia, unspecified type            Plan:   Barb Hudson was seen today for diabetes. Diagnoses and all orders for this visit:    Diabetes mellitus, insulin dependent (IDDM), controlled ([address]):  Stable, cont same meds  -     Cancel: POCT INR  -     Lipid Panel; Future  -     Comprehensive Metabolic Panel; Future  -     TSH without Reflex; Future  -     POCT glycosylated hemoglobin (Hb A1C)    Dysphagia:   To gi for probable egd,     6 months

## 2019-07-21 ENCOUNTER — PATIENT MESSAGE (OUTPATIENT)
Dept: ENDOCRINOLOGY | Age: 49
End: 2019-07-21

## 2019-08-12 ENCOUNTER — ANESTHESIA EVENT (OUTPATIENT)
Dept: ENDOSCOPY | Age: 49
End: 2019-08-12
Payer: COMMERCIAL

## 2019-08-14 ENCOUNTER — ANESTHESIA (OUTPATIENT)
Dept: ENDOSCOPY | Age: 49
End: 2019-08-14
Payer: COMMERCIAL

## 2019-08-14 ENCOUNTER — HOSPITAL ENCOUNTER (OUTPATIENT)
Age: 49
Setting detail: OUTPATIENT SURGERY
Discharge: HOME | End: 2019-08-14
Attending: INTERNAL MEDICINE | Admitting: INTERNAL MEDICINE
Payer: COMMERCIAL

## 2019-08-14 VITALS
OXYGEN SATURATION: 100 % | RESPIRATION RATE: 16 BRPM | HEIGHT: 64 IN | SYSTOLIC BLOOD PRESSURE: 120 MMHG | HEART RATE: 83 BPM | TEMPERATURE: 98.4 F | WEIGHT: 195.13 LBS | BODY MASS INDEX: 33.31 KG/M2 | DIASTOLIC BLOOD PRESSURE: 76 MMHG

## 2019-08-14 VITALS — DIASTOLIC BLOOD PRESSURE: 69 MMHG | SYSTOLIC BLOOD PRESSURE: 109 MMHG | OXYGEN SATURATION: 95 %

## 2019-08-14 DIAGNOSIS — R13.10 DYSPHAGIA, UNSPECIFIED TYPE: ICD-10-CM

## 2019-08-14 LAB
GLUCOSE BLD-MCNC: 113 MG/DL (ref 70–99)
PERFORMED ON: ABNORMAL
PREGNANCY, URINE: NEGATIVE

## 2019-08-14 PROCEDURE — 7100000011 HC PHASE II RECOVERY - ADDTL 15 MIN: Performed by: INTERNAL MEDICINE

## 2019-08-14 PROCEDURE — 84703 CHORIONIC GONADOTROPIN ASSAY: CPT

## 2019-08-14 PROCEDURE — 3609012400 HC EGD TRANSORAL BIOPSY SINGLE/MULTIPLE: Performed by: INTERNAL MEDICINE

## 2019-08-14 PROCEDURE — 7100000010 HC PHASE II RECOVERY - FIRST 15 MIN: Performed by: INTERNAL MEDICINE

## 2019-08-14 PROCEDURE — 2709999900 HC NON-CHARGEABLE SUPPLY: Performed by: INTERNAL MEDICINE

## 2019-08-14 PROCEDURE — 6360000002 HC RX W HCPCS

## 2019-08-14 PROCEDURE — 3700000001 HC ADD 15 MINUTES (ANESTHESIA): Performed by: INTERNAL MEDICINE

## 2019-08-14 PROCEDURE — 3609015300 HC ESOPHAGEAL DILATION MALONEY: Performed by: INTERNAL MEDICINE

## 2019-08-14 PROCEDURE — 2500000003 HC RX 250 WO HCPCS

## 2019-08-14 PROCEDURE — 2580000003 HC RX 258: Performed by: ANESTHESIOLOGY

## 2019-08-14 PROCEDURE — 3700000000 HC ANESTHESIA ATTENDED CARE: Performed by: INTERNAL MEDICINE

## 2019-08-14 PROCEDURE — 88305 TISSUE EXAM BY PATHOLOGIST: CPT

## 2019-08-14 RX ORDER — SODIUM CHLORIDE 9 MG/ML
INJECTION, SOLUTION INTRAVENOUS CONTINUOUS
Status: DISCONTINUED | OUTPATIENT
Start: 2019-08-14 | End: 2019-08-14 | Stop reason: HOSPADM

## 2019-08-14 RX ORDER — MONTELUKAST SODIUM 4 MG/1
1 TABLET, CHEWABLE ORAL 2 TIMES DAILY
COMMUNITY
End: 2020-08-03 | Stop reason: ALTCHOICE

## 2019-08-14 RX ORDER — PROPOFOL 10 MG/ML
INJECTION, EMULSION INTRAVENOUS PRN
Status: DISCONTINUED | OUTPATIENT
Start: 2019-08-14 | End: 2019-08-14 | Stop reason: SDUPTHER

## 2019-08-14 RX ORDER — LIDOCAINE HYDROCHLORIDE 20 MG/ML
INJECTION, SOLUTION EPIDURAL; INFILTRATION; INTRACAUDAL; PERINEURAL PRN
Status: DISCONTINUED | OUTPATIENT
Start: 2019-08-14 | End: 2019-08-14 | Stop reason: SDUPTHER

## 2019-08-14 RX ORDER — SODIUM CHLORIDE 0.9 % (FLUSH) 0.9 %
10 SYRINGE (ML) INJECTION PRN
Status: DISCONTINUED | OUTPATIENT
Start: 2019-08-14 | End: 2019-08-14 | Stop reason: SDUPTHER

## 2019-08-14 RX ORDER — SODIUM CHLORIDE 0.9 % (FLUSH) 0.9 %
10 SYRINGE (ML) INJECTION EVERY 12 HOURS SCHEDULED
Status: DISCONTINUED | OUTPATIENT
Start: 2019-08-14 | End: 2019-08-14 | Stop reason: SDUPTHER

## 2019-08-14 RX ORDER — ONDANSETRON 2 MG/ML
4 INJECTION INTRAMUSCULAR; INTRAVENOUS
Status: DISCONTINUED | OUTPATIENT
Start: 2019-08-14 | End: 2019-08-14 | Stop reason: HOSPADM

## 2019-08-14 RX ORDER — SODIUM CHLORIDE 0.9 % (FLUSH) 0.9 %
10 SYRINGE (ML) INJECTION EVERY 12 HOURS SCHEDULED
Status: DISCONTINUED | OUTPATIENT
Start: 2019-08-14 | End: 2019-08-14 | Stop reason: HOSPADM

## 2019-08-14 RX ORDER — SODIUM CHLORIDE 0.9 % (FLUSH) 0.9 %
10 SYRINGE (ML) INJECTION PRN
Status: DISCONTINUED | OUTPATIENT
Start: 2019-08-14 | End: 2019-08-14 | Stop reason: HOSPADM

## 2019-08-14 RX ADMIN — PROPOFOL 70 MG: 10 INJECTION, EMULSION INTRAVENOUS at 09:51

## 2019-08-14 RX ADMIN — SODIUM CHLORIDE: 0.9 INJECTION, SOLUTION INTRAVENOUS at 09:37

## 2019-08-14 RX ADMIN — LIDOCAINE HYDROCHLORIDE 50 MG: 20 INJECTION, SOLUTION EPIDURAL; INFILTRATION; INTRACAUDAL; PERINEURAL at 09:51

## 2019-08-14 ASSESSMENT — ENCOUNTER SYMPTOMS: SHORTNESS OF BREATH: 0

## 2019-08-14 ASSESSMENT — LIFESTYLE VARIABLES: SMOKING_STATUS: 0

## 2019-08-14 ASSESSMENT — PAIN SCALES - GENERAL
PAINLEVEL_OUTOF10: 0

## 2019-08-14 ASSESSMENT — PAIN - FUNCTIONAL ASSESSMENT: PAIN_FUNCTIONAL_ASSESSMENT: 0-10

## 2019-08-14 NOTE — H&P
[facility]   Pre-operative History and Physical    Patient: Germain Nelson  : 1970  Acct#: [de-identified]    History Obtained From: electronic medical record    HISTORY OF PRESENT ILLNESS  Procedure:EGD  Indications:dysphagia and GERD  Past Medical History:        Diagnosis Date    Chronic diarrhea     Depression     Insomnia     Type II or unspecified type diabetes mellitus without mention of complication, not stated as uncontrolled      Past Surgical History:        Procedure Laterality Date    BREAST REDUCTION SURGERY     [facility]     Medications prior to admission:   Prior to Admission medications    Medication Sig Start Date End Date Taking? Authorizing Provider   colestipol (COLESTID) 1 g tablet Take 1 g by mouth 2 times daily   Yes Historical Provider, MD   Alpha-Lipoic Acid 600 MG TABS Take 600 mg of ampicillin by mouth daily   Yes Historical Provider, MD   raNITIdine HCl (RANITIDINE 150 MAX STRENGTH PO) Take by mouth 2 times daily   Yes Historical Provider, MD   Insulin Degludec (TRESIBA FLEXTOUCH) 100 UNIT/ML SOPN INJECT 44 UNITS EVERY DAY  Patient taking differently: 40 Units nightly  19  Yes Jenni Marley MD   FARXIGA 5 MG tablet TAKE 1 TABLET BY MOUTH EVERY MORNING 19  Yes Jenni Marley MD   lisinopril (PRINIVIL;ZESTRIL) 10 MG tablet TAKE 1 TABLET BY MOUTH DAILY 3/25/19  Yes Forest Heredia MD   metFORMIN (GLUCOPHAGE-XR) 500 MG extended release tablet TAKE 2 TABLETS BY MOUTH BEFORE BREAKFAST AND DINNER 3/14/19  Yes Jenni Marley MD   Multiple Vitamins-Minerals (MULTIPLE VITAMINS/WOMENS PO) Take  by mouth daily. Yes Historical Provider, MD   aspirin 81 MG tablet Take 81 mg by mouth daily.    Yes Historical Provider, MD BRITT ULTRAFINE III SHORT PEN 31G X 8 MM MISC USE AS DIRECTED DAILY 19   Tru Israel, APRN - CNP   blood glucose test strips (ACCU-CHEK LUCIE) strip 1 each by In Vitro route Resp 16   Ht 5' 4\" (1.626 m)   Wt 195 lb 2 oz (88.5 kg)   SpO2 99%   BMI 33.49 kg/m²  I        Heart:normal    Lungs: normal    Abdomen: normal      ASA Grade:  See anesthesia note      ASSESSMENT AND PLAN:    1. Procedure options, risks and benefits reviewed with patient and expresses understanding.

## 2019-08-14 NOTE — ANESTHESIA PRE PROCEDURE
Department of Anesthesiology  Preprocedure Note       Name:  Eugenia Logan   Age:  51 y.o.  :  1970                                          MRN:  [id number]         Date:  2019      Surgeon: Spero Spatz):  Brett Wallace MD    Procedure: EGD ESOPHAGOGASTRODUODENOSCOPY (N/A )    Medications prior to admission:   Prior to Admission medications    Medication Sig Start Date End Date Taking? Authorizing Provider   colestipol (COLESTID) 1 g tablet Take 1 g by mouth 2 times daily   Yes Historical Provider, MD   Alpha-Lipoic Acid 600 MG TABS Take 600 mg of ampicillin by mouth daily   Yes Historical Provider, MD   raNITIdine HCl (RANITIDINE 150 MAX STRENGTH PO) Take by mouth 2 times daily   Yes Historical Provider, MD   Insulin Degludec (TRESIBA FLEXTOUCH) 100 UNIT/ML SOPN INJECT 44 UNITS EVERY DAY  Patient taking differently: 40 Units nightly  19  Yes Avtar Mendez MD   FARXIGA 5 MG tablet TAKE 1 TABLET BY MOUTH EVERY MORNING 19  Yes Avtar Mendez MD   lisinopril (PRINIVIL;ZESTRIL) 10 MG tablet TAKE 1 TABLET BY MOUTH DAILY 3/25/19  Yes Ofelia Garber MD   metFORMIN (GLUCOPHAGE-XR) 500 MG extended release tablet TAKE 2 TABLETS BY MOUTH BEFORE BREAKFAST AND DINNER 3/14/19  Yes Avtar Mendez MD   Multiple Vitamins-Minerals (MULTIPLE VITAMINS/WOMENS PO) Take  by mouth daily. Yes Historical Provider, MD   aspirin 81 MG tablet Take 81 mg by mouth daily. Yes Historical Provider, MD   B-TABATHA ULTRAFINE III SHORT PEN 31G X 8 MM MISC USE AS DIRECTED DAILY 19   OMAR Galo - CNP   blood glucose test strips (ACCU-CHEK LUCIE) strip 1 each by In Vitro route daily As needed.  19   Avtar Mendez MD   ACCU-CHEK FASTCLIX LANCETS MISC 1-2 times a day 19   Avtar Mendez MD   glucose blood VI test strips (TRUETEST TEST) strip Pt tests bid 3/4/14   Ofelia Garber MD       Current medications:    Current Facility-Administered Medications   Medication Dose

## 2019-08-30 RX ORDER — METFORMIN HYDROCHLORIDE 500 MG/1
TABLET, EXTENDED RELEASE ORAL
Qty: 120 TABLET | Refills: 1 | Status: SHIPPED | OUTPATIENT
Start: 2019-08-30 | End: 2019-10-21 | Stop reason: SDUPTHER

## 2019-09-30 RX ORDER — DAPAGLIFLOZIN 5 MG/1
5 TABLET, FILM COATED ORAL EVERY MORNING
Qty: 30 TABLET | Refills: 3 | Status: SHIPPED | OUTPATIENT
Start: 2019-09-30 | End: 2020-02-03

## 2019-10-21 ENCOUNTER — HOSPITAL ENCOUNTER (OUTPATIENT)
Dept: WOMENS IMAGING | Age: 49
Discharge: HOME | End: 2019-10-21
Payer: COMMERCIAL

## 2019-10-21 DIAGNOSIS — Z12.31 BREAST CANCER SCREENING BY MAMMOGRAM: ICD-10-CM

## 2019-10-21 PROCEDURE — 77067 SCR MAMMO BI INCL CAD: CPT

## 2019-10-22 DIAGNOSIS — R92.8 ABNORMAL MAMMOGRAM: Primary | ICD-10-CM

## 2019-10-22 RX ORDER — METFORMIN HYDROCHLORIDE 500 MG/1
TABLET, EXTENDED RELEASE ORAL
Qty: 120 TABLET | Refills: 3 | Status: SHIPPED | OUTPATIENT
Start: 2019-10-22 | End: 2020-03-03

## 2019-10-29 ENCOUNTER — HOSPITAL ENCOUNTER (OUTPATIENT)
Dept: WOMENS IMAGING | Age: 49
Discharge: HOME | End: 2019-10-29
Payer: COMMERCIAL

## 2019-10-29 ENCOUNTER — HOSPITAL ENCOUNTER (OUTPATIENT)
Dept: ULTRASOUND IMAGING | Age: 49
Discharge: HOME | End: 2019-10-29
Payer: COMMERCIAL

## 2019-10-29 DIAGNOSIS — R92.8 ABNORMAL MAMMOGRAM: ICD-10-CM

## 2019-10-29 PROCEDURE — 76642 ULTRASOUND BREAST LIMITED: CPT

## 2019-10-29 PROCEDURE — G0279 TOMOSYNTHESIS, MAMMO: HCPCS

## 2019-11-06 ENCOUNTER — HOSPITAL ENCOUNTER (OUTPATIENT)
Dept: WOMENS IMAGING | Age: 49
Discharge: HOME | End: 2019-11-06
Payer: COMMERCIAL

## 2019-11-06 VITALS — DIASTOLIC BLOOD PRESSURE: 79 MMHG | SYSTOLIC BLOOD PRESSURE: 123 MMHG | OXYGEN SATURATION: 100 % | HEART RATE: 98 BPM

## 2019-11-06 DIAGNOSIS — R92.8 ABNORMAL MAMMOGRAM: ICD-10-CM

## 2019-11-06 PROCEDURE — 88305 TISSUE EXAM BY PATHOLOGIST: CPT

## 2019-11-06 PROCEDURE — 2709999900 MAM STEREO BREAST BX W LOC DEVICE 1ST LESION LEFT

## 2019-11-06 ASSESSMENT — PAIN - FUNCTIONAL ASSESSMENT
PAIN_FUNCTIONAL_ASSESSMENT: 0-10
PAIN_FUNCTIONAL_ASSESSMENT: 0-10

## 2019-11-08 ENCOUNTER — CASE MANAGEMENT (OUTPATIENT)
Dept: WOMENS IMAGING | Age: 49
End: 2019-11-08

## 2019-12-11 ENCOUNTER — OFFICE VISIT (OUTPATIENT)
Dept: INTERNAL MEDICINE CLINIC | Age: 49
End: 2019-12-11
Payer: COMMERCIAL

## 2019-12-11 VITALS
HEART RATE: 77 BPM | OXYGEN SATURATION: 91 % | DIASTOLIC BLOOD PRESSURE: 80 MMHG | BODY MASS INDEX: 33.8 KG/M2 | SYSTOLIC BLOOD PRESSURE: 116 MMHG | HEIGHT: 64 IN | WEIGHT: 198 LBS | RESPIRATION RATE: 16 BRPM

## 2019-12-11 DIAGNOSIS — R13.10 DYSPHAGIA, UNSPECIFIED TYPE: ICD-10-CM

## 2019-12-11 LAB
ANION GAP SERPL CALCULATED.3IONS-SCNC: 15 MMOL/L (ref 3–16)
BUN BLDV-MCNC: 10 MG/DL (ref 7–20)
CALCIUM SERPL-MCNC: 9.6 MG/DL (ref 8.3–10.6)
CHLORIDE BLD-SCNC: 98 MMOL/L (ref 99–110)
CO2: 24 MMOL/L (ref 21–32)
CREAT SERPL-MCNC: <0.5 MG/DL (ref 0.6–1.1)
GFR AFRICAN AMERICAN: >60
GFR NON-AFRICAN AMERICAN: >60
GLUCOSE BLD-MCNC: 142 MG/DL (ref 70–99)
HBA1C MFR BLD: 7 %
POTASSIUM SERPL-SCNC: 4.1 MMOL/L (ref 3.5–5.1)
SODIUM BLD-SCNC: 137 MMOL/L (ref 136–145)

## 2019-12-11 PROCEDURE — 83036 HEMOGLOBIN GLYCOSYLATED A1C: CPT | Performed by: INTERNAL MEDICINE

## 2019-12-11 PROCEDURE — 99214 OFFICE O/P EST MOD 30 MIN: CPT | Performed by: INTERNAL MEDICINE

## 2019-12-11 SDOH — ECONOMIC STABILITY: TRANSPORTATION INSECURITY
IN THE PAST 12 MONTHS, HAS LACK OF TRANSPORTATION KEPT YOU FROM MEETINGS, WORK, OR FROM GETTING THINGS NEEDED FOR DAILY LIVING?: NO

## 2019-12-11 SDOH — ECONOMIC STABILITY: TRANSPORTATION INSECURITY
IN THE PAST 12 MONTHS, HAS THE LACK OF TRANSPORTATION KEPT YOU FROM MEDICAL APPOINTMENTS OR FROM GETTING MEDICATIONS?: NO

## 2019-12-11 SDOH — ECONOMIC STABILITY: FOOD INSECURITY: WITHIN THE PAST 12 MONTHS, THE FOOD YOU BOUGHT JUST DIDN'T LAST AND YOU DIDN'T HAVE MONEY TO GET MORE.: NEVER TRUE

## 2019-12-11 SDOH — ECONOMIC STABILITY: FOOD INSECURITY: WITHIN THE PAST 12 MONTHS, YOU WORRIED THAT YOUR FOOD WOULD RUN OUT BEFORE YOU GOT MONEY TO BUY MORE.: NEVER TRUE

## 2019-12-11 SDOH — ECONOMIC STABILITY: INCOME INSECURITY: HOW HARD IS IT FOR YOU TO PAY FOR THE VERY BASICS LIKE FOOD, HOUSING, MEDICAL CARE, AND HEATING?: NOT HARD AT ALL

## 2019-12-11 ASSESSMENT — ENCOUNTER SYMPTOMS
COUGH: 0
VISUAL CHANGE: 0
SHORTNESS OF BREATH: 0
BLURRED VISION: 0

## 2019-12-11 ASSESSMENT — PATIENT HEALTH QUESTIONNAIRE - PHQ9
2. FEELING DOWN, DEPRESSED OR HOPELESS: 0
SUM OF ALL RESPONSES TO PHQ9 QUESTIONS 1 & 2: 0
SUM OF ALL RESPONSES TO PHQ QUESTIONS 1-9: 0
SUM OF ALL RESPONSES TO PHQ QUESTIONS 1-9: 0
1. LITTLE INTEREST OR PLEASURE IN DOING THINGS: 0

## 2020-02-03 RX ORDER — DAPAGLIFLOZIN 5 MG/1
5 TABLET, FILM COATED ORAL EVERY MORNING
Qty: 30 TABLET | Refills: 1 | Status: SHIPPED | OUTPATIENT
Start: 2020-02-03 | End: 2020-03-03

## 2020-03-03 RX ORDER — METFORMIN HYDROCHLORIDE 500 MG/1
TABLET, EXTENDED RELEASE ORAL
Qty: 360 TABLET | Refills: 3 | Status: SHIPPED | OUTPATIENT
Start: 2020-03-03 | End: 2021-03-03

## 2020-03-03 RX ORDER — DAPAGLIFLOZIN 5 MG/1
5 TABLET, FILM COATED ORAL EVERY MORNING
Qty: 90 TABLET | Refills: 3 | Status: SHIPPED | OUTPATIENT
Start: 2020-03-03 | End: 2021-03-03

## 2020-04-03 RX ORDER — LISINOPRIL 10 MG/1
TABLET ORAL
Qty: 30 TABLET | Refills: 12 | Status: SHIPPED | OUTPATIENT
Start: 2020-04-03 | End: 2021-04-26

## 2020-04-03 NOTE — TELEPHONE ENCOUNTER
Refill request for lisinopril last filled 3/25/19     Last ov 12/11/19                                            Next ov 6/17/20

## 2020-06-17 ENCOUNTER — OFFICE VISIT (OUTPATIENT)
Dept: INTERNAL MEDICINE CLINIC | Age: 50
End: 2020-06-17
Payer: COMMERCIAL

## 2020-06-17 VITALS
WEIGHT: 196.6 LBS | RESPIRATION RATE: 18 BRPM | HEIGHT: 64 IN | BODY MASS INDEX: 33.57 KG/M2 | TEMPERATURE: 97.5 F | SYSTOLIC BLOOD PRESSURE: 130 MMHG | DIASTOLIC BLOOD PRESSURE: 84 MMHG | OXYGEN SATURATION: 98 % | HEART RATE: 80 BPM

## 2020-06-17 LAB — HBA1C MFR BLD: 7.2 %

## 2020-06-17 PROCEDURE — 3051F HG A1C>EQUAL 7.0%<8.0%: CPT | Performed by: INTERNAL MEDICINE

## 2020-06-17 PROCEDURE — 83036 HEMOGLOBIN GLYCOSYLATED A1C: CPT | Performed by: INTERNAL MEDICINE

## 2020-06-17 PROCEDURE — 99213 OFFICE O/P EST LOW 20 MIN: CPT | Performed by: INTERNAL MEDICINE

## 2020-06-17 ASSESSMENT — PATIENT HEALTH QUESTIONNAIRE - PHQ9
SUM OF ALL RESPONSES TO PHQ QUESTIONS 1-9: 0
SUM OF ALL RESPONSES TO PHQ QUESTIONS 1-9: 0
SUM OF ALL RESPONSES TO PHQ9 QUESTIONS 1 & 2: 0
2. FEELING DOWN, DEPRESSED OR HOPELESS: 0
1. LITTLE INTEREST OR PLEASURE IN DOING THINGS: 0

## 2020-06-17 ASSESSMENT — ENCOUNTER SYMPTOMS
COUGH: 0
BLURRED VISION: 0
VISUAL CHANGE: 0
SHORTNESS OF BREATH: 0

## 2020-06-17 NOTE — PROGRESS NOTES
Subjective:    cc:  Dm f/u,  Patient ID: Yoseph Fernandez is a 53 y.o. female. Diabetes   She presents for her follow-up diabetic visit. She has type 2 diabetes mellitus. No MedicAlert identification noted. Her disease course has been stable (endocrinologist cut back on lantus dose as last a1c was just 6). There are no hypoglycemic associated symptoms. There are no diabetic associated symptoms. Pertinent negatives for diabetes include no blurred vision, no chest pain, no fatigue, no foot ulcerations, no polydipsia, no polyphagia, no polyuria, no visual change, no weakness and no weight loss. There are no hypoglycemic complications. Symptoms are stable. There are no diabetic complications. Risk factors for coronary artery disease include diabetes mellitus. Current diabetic treatment includes diet, insulin injections, intensive insulin program and oral agent (dual therapy). She is compliant with treatment all of the time. She is currently taking insulin at bedtime. Insulin injections are given by patient. Her weight is fluctuating minimally. She is following a generally healthy diet. Meal planning includes carbohydrate counting. She has had a previous visit with a dietitian. She participates in exercise daily. Blood glucose monitoring compliance is adequate. Her overall blood glucose range is 140-180 mg/dl. An ACE inhibitor/angiotensin II receptor blocker is being taken. She does not see a podiatrist.Eye exam is current. seeing endocrine q6 months. Alternating between me and him. Dm has been under good control. Last a1c 6.4. Minimal exercise but active at work. Continued dysphagia. Only with rice. On zantac. Seeing gi. S/p egd. No stricture. Stopped zantac due to recall. No change in dysphagia    Review of Systems   Constitutional: Negative for fatigue, unexpected weight change and weight loss. Eyes: Negative for blurred vision. Respiratory: Negative for cough and shortness of breath. Physical Exam   Constitutional: She appears well-developed and well-nourished. No distress. Neck: No JVD present. No bruits   Cardiovascular: Normal rate, regular rhythm and normal heart sounds. Exam reveals no gallop and no friction rub. No murmur heard. Pulmonary/Chest: Effort normal and breath sounds normal. No respiratory distress. She has no wheezes. She has no rales. Abdominal: She exhibits no distension. There is no abdominal tenderness. There is no CVA tenderness. Musculoskeletal:         General: No edema. Skin: Skin is warm and dry. She is not diaphoretic. No erythema. Vitals reviewed. Assessment:      1. Diabetes mellitus, insulin dependent (IDDM), controlled ([address]    2. Dysphagia, unspecified type            Plan:     Arline Parisi was seen today for diabetes. Diagnoses and all orders for this visit:    Diabetes mellitus, insulin dependent (IDDM), controlled ([address]):  Stable, cont same meds  -     Basic Metabolic Panel; Future  -     Lipid Panel; Future  -     TSH with Reflex;  Future  -     POCT glycosylated hemoglobin (Hb A1C)    Dysphagia, unspecified type:  Unchanged, on no meds          6 months

## 2020-06-18 LAB
ANION GAP SERPL CALCULATED.3IONS-SCNC: 17 MMOL/L (ref 3–16)
BUN BLDV-MCNC: 11 MG/DL (ref 7–20)
CALCIUM SERPL-MCNC: 9 MG/DL (ref 8.3–10.6)
CHLORIDE BLD-SCNC: 98 MMOL/L (ref 99–110)
CHOLESTEROL, TOTAL: 148 MG/DL (ref 0–199)
CO2: 21 MMOL/L (ref 21–32)
CREAT SERPL-MCNC: <0.5 MG/DL (ref 0.6–1.1)
GFR AFRICAN AMERICAN: >60
GFR NON-AFRICAN AMERICAN: >60
GLUCOSE BLD-MCNC: 144 MG/DL (ref 70–99)
HDLC SERPL-MCNC: 53 MG/DL (ref 40–60)
LDL CHOLESTEROL CALCULATED: 51 MG/DL
POTASSIUM SERPL-SCNC: 4.4 MMOL/L (ref 3.5–5.1)
SODIUM BLD-SCNC: 136 MMOL/L (ref 136–145)
TRIGL SERPL-MCNC: 221 MG/DL (ref 0–150)
TSH REFLEX: 2.73 UIU/ML (ref 0.27–4.2)
VLDLC SERPL CALC-MCNC: 44 MG/DL

## 2020-06-26 RX ORDER — PEN NEEDLE, DIABETIC 31 GX5/16"
NEEDLE, DISPOSABLE MISCELLANEOUS
Qty: 100 EACH | Refills: 5 | Status: SHIPPED | OUTPATIENT
Start: 2020-06-26 | End: 2021-06-28

## 2020-08-03 ENCOUNTER — VIRTUAL VISIT (OUTPATIENT)
Dept: ENDOCRINOLOGY | Age: 50
End: 2020-08-03
Payer: COMMERCIAL

## 2020-08-03 ENCOUNTER — TELEPHONE (OUTPATIENT)
Dept: ENDOCRINOLOGY | Age: 50
End: 2020-08-03

## 2020-08-03 PROCEDURE — 99213 OFFICE O/P EST LOW 20 MIN: CPT | Performed by: INTERNAL MEDICINE

## 2020-08-03 PROCEDURE — 3051F HG A1C>EQUAL 7.0%<8.0%: CPT | Performed by: INTERNAL MEDICINE

## 2020-08-03 RX ORDER — INSULIN GLARGINE 100 [IU]/ML
40 INJECTION, SOLUTION SUBCUTANEOUS NIGHTLY
Qty: 5 PEN | Refills: 3 | Status: SHIPPED | OUTPATIENT
Start: 2020-08-03 | End: 2020-08-03 | Stop reason: SDUPTHER

## 2020-08-03 RX ORDER — INSULIN GLARGINE 100 [IU]/ML
INJECTION, SOLUTION SUBCUTANEOUS
Qty: 5 PEN | Refills: 3 | Status: SHIPPED | OUTPATIENT
Start: 2020-08-03 | End: 2020-12-10

## 2020-08-03 NOTE — TELEPHONE ENCOUNTER
We won't be able to fill prescription as not see since 2/19  She should be seen sooner for refill and dose assessment

## 2020-08-03 NOTE — TELEPHONE ENCOUNTER
Pharmacy called and would like to get clarification on lantus  Script says 40 units at night and 44 daily?

## 2020-08-03 NOTE — PROGRESS NOTES
Seen as f/u patient for diabetes        Pursuant to the emergency declaration under the [facility], [facility] waiver authority and the [employer] Act, this Virtual  Visit was conducted, with patient's consent, to reduce the patient's risk of exposure to COVID-19 and provide continuity of care for an established patient. Patient was at home. Provider was at home. No one else was involved  Services were provided through a video synchronous discussion virtually to substitute for in-person clinic visit.     Interim:   Seen after 2/19  Glucose higher    Diagnosed with Type 2 diabetes mellitus in  2010  Known diabetic complications: neuropathy    Current diabetic medications   Metformin ER 2 BID  Lantus 40 units    [employer]    She has tried Cori Jamaica in the past.    Intolerance to diabetes medications: yes - Metformin plain form    Last A1c 7.2%<----6.6% <---- 6.9%<------ 6.4%<----- 6.3%<---- 6.2% on 4/17<---- 6.4 on 4/16<------5.8 on 9/15<---- 5.7 on 3/15<---6.0 on 8/14<-----6.5 on 2/14<---8.0 on 11/13<---9.5 on 7/13<--- 9.0<--- 6.5    Prior visit with dietician: Yes   Current diet: on average, 3 meals per day   Current exercise: walking   Current monitoring regimen: home blood tests -occ    Has brought blood glucose log/meter: no  Home blood sugar records: 100s  Any episodes of hypoglycemia? occ    No Hx of CAD , PVD, CVA    Hyperlipidemia: LDL 68 o n 7/13---> LDL 66 on 5/18  Last eye exam:6/17  Last foot exam: 8/18  Last microalbumin to creatinine ratio: 1/18  ACE-I or ARB: Lisinopril 10mg    Neuropathy improved, stable    Past Medical History:   Diagnosis Date    Chronic diarrhea     Depression     Insomnia     Type II or unspecified type diabetes mellitus without mention of complication, not stated as uncontrolled      Past Surgical History:   Procedure Laterality Date    BREAST REDUCTION SURGERY  1990    CHOLECYSTECTOMY pain.   Genitourinary: Negative for dysuria, urgency and frequency. Musculoskeletal: Negative for back pain. No joint pain  Skin: Negative for itching and rash. Neurological: Negative for dizziness. Negative for tingling, tremors, focal weakness and headaches. Endo/Heme/Allergies: see HPI  Psychiatric/Behavioral: Negative for depression and substance abuse. PHYSICAL EXAMINATION:  [ INSTRUCTIONS:  \"[x]\" Indicates a positive item  \"[]\" Indicates a negative item  -- DELETE ALL ITEMS NOT EXAMINED]  Vital Signs: (As obtained by patient/caregiver or practitioner observation)    Blood pressure-  Heart rate-    Respiratory rate- 14   Temperature-  Pulse oximetry-     Constitutional Appears well-developed and well-nourished No apparent distress        Mental status  Alert and awake  Oriented to person/place/time  Able to follow commands      Eyes:  EOM    [x]  Normal    Sclera  [x]  Normal           Discharge [x]  None visible      HENT:   [x] Normocephalic, atraumatic.     [x] Mouth/Throat: Mucous membranes are moist.     External Ears [x] Normal  no discharge    Neck: [x] No visualized mass  no swelling    Pulmonary/Chest: [x] Respiratory effort normal.  [x] No visualized signs of difficulty breathing or respiratory distress             Musculoskeletal:   [x] Normal gait with no signs of ataxia         [x] Normal range of motion of neck          Head and neck stable, appears normal ROM, strength good    Neurological:        [x] No Facial Asymmetry (Cranial nerve 7 motor function) (limited exam to video visit)          [x] No gaze palsy                Skin:        [x] No significant exanthematous lesions or discoloration noted on facial skin                 Psychiatric:       [x] Normal Affect [x] No Hallucinations            Other pertinent observable physical exam findings-     Due to this being a TeleHealth encounter, evaluation of the following organ systems is limited:

## 2020-08-04 ENCOUNTER — PATIENT MESSAGE (OUTPATIENT)
Dept: ENDOCRINOLOGY | Age: 50
End: 2020-08-04

## 2020-08-04 RX ORDER — BLOOD SUGAR DIAGNOSTIC
1 STRIP MISCELLANEOUS DAILY
Qty: 100 EACH | Refills: 3 | Status: SHIPPED | OUTPATIENT
Start: 2020-08-04 | End: 2021-12-30 | Stop reason: SDUPTHER

## 2020-08-04 NOTE — TELEPHONE ENCOUNTER
From: Lazarus Sequin  To: Brett Hubbard MD  Sent: 8/4/2020 8:15 AM EDT  Subject: Prescription Question    The prescription for the Accu-Check Matilde Plus Strips does not fit the meter I have. They are too wide. I have an [address]. Could you send in a prescription for test strips for that meter?

## 2020-10-08 ENCOUNTER — OFFICE VISIT (OUTPATIENT)
Dept: ENDOCRINOLOGY | Age: 50
End: 2020-10-08
Payer: COMMERCIAL

## 2020-10-08 VITALS
DIASTOLIC BLOOD PRESSURE: 72 MMHG | RESPIRATION RATE: 14 BRPM | BODY MASS INDEX: 33.12 KG/M2 | OXYGEN SATURATION: 96 % | HEIGHT: 64 IN | WEIGHT: 194 LBS | HEART RATE: 103 BPM | SYSTOLIC BLOOD PRESSURE: 126 MMHG

## 2020-10-08 LAB — HBA1C MFR BLD: 7.2 %

## 2020-10-08 PROCEDURE — 83036 HEMOGLOBIN GLYCOSYLATED A1C: CPT | Performed by: INTERNAL MEDICINE

## 2020-10-08 PROCEDURE — 3051F HG A1C>EQUAL 7.0%<8.0%: CPT | Performed by: INTERNAL MEDICINE

## 2020-10-08 PROCEDURE — 99214 OFFICE O/P EST MOD 30 MIN: CPT | Performed by: INTERNAL MEDICINE

## 2020-10-08 RX ORDER — DULAGLUTIDE 0.75 MG/.5ML
0.75 INJECTION, SOLUTION SUBCUTANEOUS WEEKLY
Qty: 4 PEN | Refills: 2 | Status: SHIPPED | OUTPATIENT
Start: 2020-10-08 | End: 2020-12-29 | Stop reason: SDUPTHER

## 2020-10-08 NOTE — PROGRESS NOTES
Seen as f/u patient for diabetes      Interim:     Glucose high in the am  Has been adjusting lantus    Diagnosed with Type 2 diabetes mellitus in  2010  Known diabetic complications: neuropathy    Current diabetic medications   Metformin ER 2 BID  Lantus 48 units    Leisa Weiss    She has tried Sammuel Jerad in the past.    Intolerance to diabetes medications: yes - Metformin plain form    Mild, controlled    Last A1c 7.2%<----6.6% <---- 6.9%<------ 6.4%<----- 6.3%<---- 6.2% on 4/17<---- 6.4 on 4/16<------5.8 on 9/15<---- 5.7 on 3/15<---6.0 on 8/14<-----6.5 on 2/14<---8.0 on 11/13<---9.5 on 7/13<--- 9.0<--- 6.5    Prior visit with dietician: Yes   Current diet: on average, 3 meals per day   Current exercise: walking   Current monitoring regimen: home blood tests -occ    Has brought blood glucose log/meter: yes  Home blood sugar records: 100s  Any episodes of hypoglycemia? occ    No Hx of CAD , PVD, CVA    Hyperlipidemia: LDL 68 o n 7/13---> LDL 66 on 5/18  Last eye exam: 2019  Last foot exam: 10/20  Last microalbumin to creatinine ratio: 1/18  ACE-I or ARB: Lisinopril 10mg    Neuropathy improved, stable    Past Medical History:   Diagnosis Date    Chronic diarrhea     Depression     Insomnia     Type II or unspecified type diabetes mellitus without mention of complication, not stated as uncontrolled      Past Surgical History:   Procedure Laterality Date    BREAST REDUCTION SURGERY  1990    CHOLECYSTECTOMY      ESOPHAGEAL DILATATION N/A 8/14/2019    ESOPHAGEAL DILATION [place]uff performed by Kt Nails MD at [address] [facility] ENDOSCOPY N/A 8/14/2019    EGD BIOPSY performed by Kt Nails MD at [facility] ENDOSCOPY     Current Outpatient Medications   Medication Sig Dispense Refill    blood glucose test strips (ACCU-CHEK GUIDE) strip 1 each by In Vitro route daily As needed.  100 each 3    insulin glargine (LANTUS SOLOSTAR) 100 UNIT/ML injection pen ADMINISTER 44 UNITS UNDER THE SKIN DAILY (Patient taking differently: ADMINISTER 48 UNITS UNDER THE SKIN DAILY) 5 pen 3    blood glucose test strips (ACCU-CHEK LUCIE) strip 1 each by In Vitro route daily As needed. 100 each 3    B-D ULTRAFINE III SHORT PEN 31G X 8 MM MISC USE DAILY AS DIRECTED 100 each 5    lisinopril (PRINIVIL;ZESTRIL) 10 MG tablet TAKE 1 TABLET BY MOUTH DAILY 30 tablet 12    FARXIGA 5 MG tablet TAKE 1 TABLET BY MOUTH EVERY MORNING 90 tablet 3    metFORMIN (GLUCOPHAGE-XR) 500 MG extended release tablet TAKE 2 TABLETS BY MOUTH BEFORE BREAKFAST AND DINNER 360 tablet 3    Alpha-Lipoic Acid 600 MG TABS Take 600 mg of ampicillin by mouth daily      ACCU-CHEK FASTCLIX LANCETS MISC 1-2 times a day 100 each 2    Multiple Vitamins-Minerals (MULTIPLE VITAMINS/WOMENS PO) Take  by mouth daily.  aspirin 81 MG tablet Take 81 mg by mouth daily.  glucose blood VI test strips (TRUETEST TEST) strip Pt tests bid 100 strip 12     No current facility-administered medications for this visit. Review of Systems  Scanned, reviewed    Vitals:    10/08/20 1736   BP: 126/72   Pulse: 103   Resp: 14   SpO2: 96%       Constitutional: Well-developed, appears stated age, cooperative, in no acute distress  H/E/N/M/T:atraumatic, normocephalic, external ears, nose, lips normal without lesions  Eyes: Lids, lashes, conjunctivae and sclerae normal, No proptosis, no redness  Neck: supple, symmetrical, no swelling  Skin: No obvious rashes or lesions present.   Skin and hair texture normal  Psychiatric: Judgement and Insight:  judgement and insight appear normal  Neuro: Normal without focal findings, speech is normal normal, speech is spontaneous  Chest: No labored breathing, no chest deformity, no stridor  Musculoskeletal: No joint deformity, swelling      8/18  Skeletal foot exam is normal, no skin lesions, toenails are normal, 10 g monofilament is 8/10 on the right and 8/10 on the left , 128 Hz vibration sense is nl bilaterally. Lab Reviewed   No components found for: CHLPL  Lab Results   Component Value Date    TRIG 221 (H) 06/17/2020    TRIG 155 (H) 06/05/2019    TRIG 150 11/26/2018     Lab Results   Component Value Date    HDL 53 06/17/2020    HDL 54 06/05/2019    HDL 49 11/26/2018     Lab Results   Component Value Date    LDLCALC 51 06/17/2020    LDLCALC 70 06/05/2019    LDLCALC 66 11/26/2018     Lab Results   Component Value Date    LABVLDL 44 06/17/2020    LABVLDL 31 06/05/2019    LABVLDL 30 11/26/2018     Lab Results   Component Value Date    LABA1C 7.2 06/17/2020       Assessment:     Vipul Arredondo is a 53 y.o. female with :    1.T2DM:Insulin resistant,well controlled, has early neuropathy. A1c near goal. She has fasting hyperglycemia. Will try GLP -1 agonist  2. Neuropathy: Recommend to add Neurontin but will like to wait  3,Obesity: recommend weight loss    Plan:      Lantus 48 units, advised self titration for glucose < 130   Continue metformin ER   Continue farxiga   Start trulicity   If cost is an issue, switch to NPH and amaryl   Advised to check blood sugar 1 times a day   Patient to send blood sugar log for titration. Advise to exercise regularly. Advise to low simple carbohydrate and protein with each  meal diet. Diabetes Care: recommended yearly eye exam, foot exam and urine microalbumin to   creatinine ratio.  Patient is up-to-date.       -Hyperlipidemia, LDL goal is <100 mg/dl   -Hypertension:Continue same  -Daily ASA:Not indicated  -Smoking status:Non smoker    F/u in 6 months

## 2020-11-13 ENCOUNTER — TELEPHONE (OUTPATIENT)
Dept: INTERNAL MEDICINE CLINIC | Age: 50
End: 2020-11-13

## 2020-11-13 ENCOUNTER — NURSE ONLY (OUTPATIENT)
Dept: INTERNAL MEDICINE CLINIC | Age: 50
End: 2020-11-13

## 2020-11-13 LAB
CREATININE URINE: 57.1 MG/DL (ref 28–259)
MICROALBUMIN UR-MCNC: <1.2 MG/DL
MICROALBUMIN/CREAT UR-RTO: NORMAL MG/G (ref 0–30)

## 2020-11-13 NOTE — TELEPHONE ENCOUNTER
Tried calling patient to verify what tests she needed ran on her urine that she dropped off today. VM was full and could not leave a message.  Sent the microalbumin ordered by Dr Elbert Spicer in August.

## 2020-12-10 NOTE — TELEPHONE ENCOUNTER
Medication:   Requested Prescriptions     Pending Prescriptions Disp Refills    insulin glargine (LANTUS SOLOSTAR) 100 UNIT/ML injection pen [Pharmacy Med Name: Brian Blackburn PEN INJ 3ML] 15 mL 3     Sig: ADMINISTER 48 UNITS UNDER THE SKIN DAILY       Last Filled:      Patient Phone Number: [phone] (home)     Last appt: 8/3/2020   Next appt: Visit date not found    Last Labs DM:   Lab Results   Component Value Date    LABA1C 7.2 10/08/2020

## 2020-12-11 RX ORDER — INSULIN GLARGINE 100 [IU]/ML
INJECTION, SOLUTION SUBCUTANEOUS
Qty: 15 ML | Refills: 3 | Status: SHIPPED | OUTPATIENT
Start: 2020-12-11 | End: 2021-05-17

## 2020-12-16 ENCOUNTER — HOSPITAL ENCOUNTER (OUTPATIENT)
Dept: WOMENS IMAGING | Age: 50
Discharge: HOME | End: 2020-12-16
Payer: COMMERCIAL

## 2020-12-16 PROCEDURE — 77063 BREAST TOMOSYNTHESIS BI: CPT

## 2020-12-21 ENCOUNTER — OFFICE VISIT (OUTPATIENT)
Dept: INTERNAL MEDICINE CLINIC | Age: 50
End: 2020-12-21
Payer: COMMERCIAL

## 2020-12-21 VITALS
WEIGHT: 195 LBS | HEART RATE: 85 BPM | SYSTOLIC BLOOD PRESSURE: 112 MMHG | DIASTOLIC BLOOD PRESSURE: 82 MMHG | BODY MASS INDEX: 33.47 KG/M2 | TEMPERATURE: 97.5 F | OXYGEN SATURATION: 98 % | RESPIRATION RATE: 12 BRPM

## 2020-12-21 LAB
A/G RATIO: 1.7 (ref 1.1–2.2)
ALBUMIN SERPL-MCNC: 4.3 G/DL (ref 3.4–5)
ALP BLD-CCNC: 81 U/L (ref 40–129)
ALT SERPL-CCNC: 36 U/L (ref 10–40)
ANION GAP SERPL CALCULATED.3IONS-SCNC: 14 MMOL/L (ref 3–16)
AST SERPL-CCNC: 19 U/L (ref 15–37)
BILIRUB SERPL-MCNC: 0.3 MG/DL (ref 0–1)
BUN BLDV-MCNC: 16 MG/DL (ref 7–20)
CALCIUM SERPL-MCNC: 9.4 MG/DL (ref 8.3–10.6)
CHLORIDE BLD-SCNC: 100 MMOL/L (ref 99–110)
CHOLESTEROL, TOTAL: 164 MG/DL (ref 0–199)
CO2: 25 MMOL/L (ref 21–32)
CREAT SERPL-MCNC: 0.5 MG/DL (ref 0.6–1.1)
GFR AFRICAN AMERICAN: >60
GFR NON-AFRICAN AMERICAN: >60
GLOBULIN: 2.6 G/DL
GLUCOSE BLD-MCNC: 95 MG/DL (ref 70–99)
HDLC SERPL-MCNC: 53 MG/DL (ref 40–60)
LDL CHOLESTEROL CALCULATED: 84 MG/DL
POTASSIUM SERPL-SCNC: 4.6 MMOL/L (ref 3.5–5.1)
SODIUM BLD-SCNC: 139 MMOL/L (ref 136–145)
TOTAL PROTEIN: 6.9 G/DL (ref 6.4–8.2)
TRIGL SERPL-MCNC: 133 MG/DL (ref 0–150)
VLDLC SERPL CALC-MCNC: 27 MG/DL

## 2020-12-21 PROCEDURE — 3051F HG A1C>EQUAL 7.0%<8.0%: CPT | Performed by: INTERNAL MEDICINE

## 2020-12-21 PROCEDURE — 99214 OFFICE O/P EST MOD 30 MIN: CPT | Performed by: INTERNAL MEDICINE

## 2020-12-21 RX ORDER — DULAGLUTIDE 0.75 MG/.5ML
0.75 INJECTION, SOLUTION SUBCUTANEOUS WEEKLY
Qty: 4 PEN | Refills: 0 | COMMUNITY
Start: 2020-12-21 | End: 2021-03-18

## 2020-12-21 ASSESSMENT — ENCOUNTER SYMPTOMS
SHORTNESS OF BREATH: 0
BLURRED VISION: 0
VISUAL CHANGE: 0
COUGH: 0

## 2020-12-21 NOTE — PROGRESS NOTES
Subjective:    cc:  Dm f/u,  Patient ID: Edilia Sprague is a 53 y.o. female. Diabetes  She presents for her follow-up diabetic visit. She has type 2 diabetes mellitus. No MedicAlert identification noted. Her disease course has been stable (endocrinologist cut back on lantus dose as last a1c was just 6). There are no hypoglycemic associated symptoms. There are no diabetic associated symptoms. Pertinent negatives for diabetes include no blurred vision, no chest pain, no fatigue, no foot ulcerations, no polydipsia, no polyphagia, no polyuria, no visual change, no weakness and no weight loss. There are no hypoglycemic complications. Symptoms are stable. There are no diabetic complications. Risk factors for coronary artery disease include diabetes mellitus. Current diabetic treatment includes diet, insulin injections, intensive insulin program and oral agent (dual therapy). She is compliant with treatment all of the time. She is currently taking insulin at bedtime. Insulin injections are given by patient. Her weight is fluctuating minimally. She is following a generally healthy diet. Meal planning includes carbohydrate counting. She has had a previous visit with a dietitian. She participates in exercise daily. Blood glucose monitoring compliance is excellent. Her overall blood glucose range is  mg/dl. An ACE inhibitor/angiotensin II receptor blocker is being taken. She does not see a podiatrist.Eye exam is current. seeing endocrine q6 months. Alternating between me and him. Dm has been under good control. Last a1c 7.2. Minimal exercise but active at work. No further dysphagia. Avoids foods that cause it. Review of Systems   Constitutional: Negative for fatigue, unexpected weight change and weight loss. Eyes: Negative for blurred vision. Respiratory: Negative for cough and shortness of breath. Cardiovascular: Negative for chest pain, palpitations and leg swelling. Endocrine: Negative for polydipsia, polyphagia and polyuria. Neurological: Negative for syncope, weakness and light-headedness. Prior to Visit Medications    Medication Sig Taking? Authorizing Provider   insulin glargine (LANTUS SOLOSTAR) 100 UNIT/ML injection pen ADMINISTER 48 UNITS UNDER THE SKIN DAILY Yes Jose Elias Abraham MD   Dulaglutide (TRULICITY) 1.66 PE/1.6LZ SOPN Inject 0.75 mg into the skin once a week Yes Jose Elias Abraham MD   lisinopril (PRINIVIL;ZESTRIL) 10 MG tablet TAKE 1 TABLET BY MOUTH DAILY Yes Mervin Stevenson MD   FARXIGA 5 MG tablet TAKE 1 TABLET BY Zak Show Yes Jose Elias Abraham MD   metFORMIN (GLUCOPHAGE-XR) 500 MG extended release tablet TAKE 2 TABLETS BY MOUTH BEFORE BREAKFAST AND DINNER Yes Jose Elias Abraham MD   Alpha-Lipoic Acid 600 MG TABS Take 600 mg of ampicillin by mouth daily Yes Historical Provider, MD   Multiple Vitamins-Minerals (MULTIPLE VITAMINS/WOMENS PO) Take  by mouth daily. Yes Historical Provider, MD   aspirin 81 MG tablet Take 81 mg by mouth daily. Yes Historical Provider, MD   blood glucose test strips (ACCU-CHEK GUIDE) strip 1 each by In Vitro route daily As needed. Jose Elias Abraham MD   blood glucose test strips (ACCU-CHEK LUCIE) strip 1 each by In Vitro route daily As needed. Jose Elias Abraham MD   B-D ULTRAFINE III SHORT PEN 31G X 8 MM MISC USE DAILY AS DIRECTED  JUANI Hayes MD   ACCU-CHEK FASTCLIX LANCETS MISC 1-2 times a day  Jose Elias Abraham MD   glucose blood VI test strips (TRUETEST TEST) strip Pt tests bid  Mervin Stevenson MD     /82 (Site: Left Upper Arm, Position: Sitting, Cuff Size: Large Adult)   Pulse 85   Temp 97.5 °F (36.4 °C) (Infrared)   Resp 12   Wt 195 lb (88.5 kg)   SpO2 98%   Breastfeeding No   BMI 33.47 kg/m²       Objective:   Physical Exam   Constitutional: She appears well-developed and well-nourished. No distress. Neck: No JVD present.    No bruits Cardiovascular: Normal rate, regular rhythm, normal heart sounds and intact distal pulses. Exam reveals no gallop and no friction rub. No murmur heard. Pulmonary/Chest: Effort normal and breath sounds normal. No respiratory distress. She has no wheezes. She has no rales. Abdominal: She exhibits no distension. There is no abdominal tenderness. There is no CVA tenderness. Musculoskeletal:         General: No edema. Neurological:   Monofilament normal bilat feet   Skin: Skin is warm and dry. She is not diaphoretic. No erythema. Vitals reviewed. Assessment:      1. Type 1 diabetes mellitus without complication ([address]    2. Dysphagia, unspecified type            Plan:     Harwood Prader was seen today for diabetes and anxiety. Diagnoses and all orders for this visit:    Type 1 diabetes mellitus without complication ([address]):  Stable, cont same meds  -     Comprehensive Metabolic Panel; Future  -     Lipid Panel;  Future  -      DIABETES FOOT EXAM    Dysphagia, unspecified type:  resolved      6 months

## 2020-12-29 RX ORDER — DULAGLUTIDE 0.75 MG/.5ML
0.75 INJECTION, SOLUTION SUBCUTANEOUS WEEKLY
Qty: 4 PEN | Refills: 2 | Status: SHIPPED | OUTPATIENT
Start: 2020-12-29 | End: 2021-03-18

## 2020-12-29 NOTE — TELEPHONE ENCOUNTER
LOV: 10/8/2020    NOV: 3/18/2021    DOSE: 0.75 mg     Frequency: weekly     Pharmacy as Pended:     Per LOV Note: Start trulicity    Per Last PHONE NOTE:     Lab Results   Component Value Date    LABA1C 7.2 10/08/2020

## 2021-03-03 RX ORDER — METFORMIN HYDROCHLORIDE 500 MG/1
TABLET, EXTENDED RELEASE ORAL
Qty: 360 TABLET | Refills: 3 | Status: SHIPPED | OUTPATIENT
Start: 2021-03-03 | End: 2022-03-15

## 2021-03-03 RX ORDER — DAPAGLIFLOZIN 5 MG/1
5 TABLET, FILM COATED ORAL EVERY MORNING
Qty: 90 TABLET | Refills: 3 | Status: SHIPPED | OUTPATIENT
Start: 2021-03-03 | End: 2022-03-01

## 2021-03-03 NOTE — TELEPHONE ENCOUNTER
Medication:   Requested Prescriptions     Pending Prescriptions Disp Refills    metFORMIN (GLUCOPHAGE-XR) 500 MG extended release tablet [Pharmacy Med Name: METFORMIN ER 500MG 24HR TABS] 360 tablet 3     Sig: TAKE 2 TABLETS BY MOUTH BEFORE BREAKFAST AND DINNER    [employer] 5 MG tablet [Pharmacy Med Name: [facility] [employer] 5MG TABLETS] 90 tablet 3     Sig: TAKE 1 TABLET BY MOUTH EVERY MORNING       Last Filled:      Patient Phone Number: [phone] (home)     Last appt: 10/08  Next appt: 03/18  Last Labs DM:   Lab Results   Component Value Date    LABA1C 7.2 10/08/2020

## 2021-03-18 ENCOUNTER — OFFICE VISIT (OUTPATIENT)
Dept: ENDOCRINOLOGY | Age: 51
End: 2021-03-18
Payer: COMMERCIAL

## 2021-03-18 VITALS
TEMPERATURE: 97.7 F | DIASTOLIC BLOOD PRESSURE: 74 MMHG | BODY MASS INDEX: 32.61 KG/M2 | OXYGEN SATURATION: 97 % | WEIGHT: 190 LBS | HEART RATE: 111 BPM | SYSTOLIC BLOOD PRESSURE: 110 MMHG

## 2021-03-18 DIAGNOSIS — E11.9 CONTROLLED TYPE 2 DIABETES MELLITUS WITHOUT COMPLICATION, WITH LONG-TERM CURRENT USE OF INSULIN (HCC): Primary | ICD-10-CM

## 2021-03-18 DIAGNOSIS — Z79.4 CONTROLLED TYPE 2 DIABETES MELLITUS WITHOUT COMPLICATION, WITH LONG-TERM CURRENT USE OF INSULIN (HCC): Primary | ICD-10-CM

## 2021-03-18 DIAGNOSIS — G62.9 NEUROPATHY: ICD-10-CM

## 2021-03-18 LAB — HBA1C MFR BLD: 6.2 %

## 2021-03-18 PROCEDURE — 99214 OFFICE O/P EST MOD 30 MIN: CPT | Performed by: INTERNAL MEDICINE

## 2021-03-18 PROCEDURE — 83036 HEMOGLOBIN GLYCOSYLATED A1C: CPT | Performed by: INTERNAL MEDICINE

## 2021-03-18 RX ORDER — DULAGLUTIDE 1.5 MG/.5ML
INJECTION, SOLUTION SUBCUTANEOUS
Qty: 4 PEN | Refills: 2 | Status: SHIPPED | OUTPATIENT
Start: 2021-03-18 | End: 2021-07-19 | Stop reason: SDUPTHER

## 2021-03-18 NOTE — PROGRESS NOTES
Seen as f/u patient for diabetes      Interim:     stable  Taking trulicity  6 lb loss  Needs new PCP    Diagnosed with Type 2 diabetes mellitus in  2010  Known diabetic complications: neuropathy    Current diabetic medications   Metformin ER 2 BID  Lantus 48 units    Farxiga  trulicity    She has tried Rizwana Hurst in the past.    Intolerance to diabetes medications: yes - Metformin plain form    Mild, controlled    Last A1c 6.2%<-----7.2%<----6.6% <---- 6.9%<------ 6.4%<----- 6.3%<---- 6.2% on 4/17<---- 6.4 on 4/16<------5.8 on 9/15<---- 5.7 on 3/15<---6.0 on 8/14<-----6.5 on 2/14<---8.0 on 11/13<---9.5 on 7/13<--- 9.0<--- 6.5    Prior visit with dietician: Yes   Current diet: on average, 3 meals per day   Current exercise: walking   Current monitoring regimen: home blood tests -occ    Has brought blood glucose log/meter: no  Home blood sugar records: 90-100s  Any episodes of hypoglycemia? occ    No Hx of CAD , PVD, CVA    Hyperlipidemia: LDL 68 o n 7/13---> LDL 66 on 5/18  Last eye exam: 10/20  Last foot exam: 10/20  Last microalbumin to creatinine ratio: 11/20  ACE-I or ARB: Lisinopril 10mg    Neuropathy improved, stable    Past Medical History:   Diagnosis Date    Chronic diarrhea     Depression     Insomnia     Type II or unspecified type diabetes mellitus without mention of complication, not stated as uncontrolled      Past Surgical History:   Procedure Laterality Date    BREAST REDUCTION SURGERY  1990    CHOLECYSTECTOMY      ESOPHAGEAL DILATATION N/A 8/14/2019    ESOPHAGEAL DILATION Jere Redreena performed by Ameya Sandhu MD [address]    UPPER GASTROINTESTINAL ENDOSCOPY N/A 8/14/2019    EGD BIOPSY performed by [facility]ya Sandhu MD at [facility] ENDOSCOPY     Current Outpatient Medications   Medication Sig Dispense Refill    metFORMIN (GLUCOPHAGE-XR) 500 MG extended release tablet TAKE 2 TABLETS BY MOUTH BEFORE BREAKFAST AND DINNER 360 tablet 3    FARXIGA 5 MG tablet TAKE 1 TABLET BY MOUTH EVERY MORNING 90 tablet 3    Dulaglutide (TRULICITY) 9.82 PHILLIP/2.2SA SOPN Inject 0.75 mg into the skin once a week 4 pen 2    Dulaglutide (TRULICITY) 1.99 ZM/9.2XU SOPN Inject 0.75 mg into the skin once a week 4 pen 0    insulin glargine (LANTUS SOLOSTAR) 100 UNIT/ML injection pen ADMINISTER 48 UNITS UNDER THE SKIN DAILY 15 mL 3    blood glucose test strips (ACCU-CHEK GUIDE) strip 1 each by In Vitro route daily As needed. 100 each 3    blood glucose test strips (ACCU-CHEK LUCIE) strip 1 each by In Vitro route daily As needed. 100 each 3    B-D ULTRAFINE III SHORT PEN 31G X 8 MM MISC USE DAILY AS DIRECTED 100 each 5    lisinopril (PRINIVIL;ZESTRIL) 10 MG tablet TAKE 1 TABLET BY MOUTH DAILY 30 tablet 12    Alpha-Lipoic Acid 600 MG TABS Take 600 mg of ampicillin by mouth daily      Multiple Vitamins-Minerals (MULTIPLE VITAMINS/WOMENS PO) Take  by mouth daily.  aspirin 81 MG tablet Take 81 mg by mouth daily.  glucose blood VI test strips (TRUETEST TEST) strip Pt tests bid 100 strip 12    ACCU-CHEK FASTCLIX LANCETS MISC 1-2 times a day 100 each 2     No current facility-administered medications for this visit. Review of Systems  Scanned, reviewed    Vitals:    03/18/21 1141   BP: 110/74   Pulse: 111   Temp: 97.7 °F (36.5 °C)   SpO2: 97%       Constitutional: Well-developed, appears stated age, cooperative, in no acute distress  H/E/N/M/T:atraumatic, normocephalic, external ears, nose, lips normal without lesions  Eyes: Lids, lashes, conjunctivae and sclerae normal, No proptosis, no redness  Neck: supple, symmetrical, no swelling  Skin: No obvious rashes or lesions present.   Skin and hair texture normal  Psychiatric: Judgement and Insight:  judgement and insight appear normal  Neuro: Normal without focal findings, speech is normal normal, speech is spontaneous  Chest: No labored breathing, no chest deformity, no stridor  Musculoskeletal: No joint deformity, swelling 8/18  Skeletal foot exam is normal, no skin lesions, toenails are normal, 10 g monofilament is 8/10 on the right and 8/10 on the left , 128 Hz vibration sense is nl bilaterally. Lab Reviewed   No components found for: CHLPL  Lab Results   Component Value Date    TRIG 133 12/21/2020    TRIG 221 (H) 06/17/2020    TRIG 155 (H) 06/05/2019     Lab Results   Component Value Date    HDL 53 12/21/2020    HDL 53 06/17/2020    HDL 54 06/05/2019     Lab Results   Component Value Date    LDLCALC 84 12/21/2020    LDLCALC 51 06/17/2020    LDLCALC 70 06/05/2019     Lab Results   Component Value Date    LABVLDL 27 12/21/2020    LABVLDL 44 06/17/2020    LABVLDL 31 06/05/2019     Lab Results   Component Value Date    LABA1C 7.2 10/08/2020       Assessment:     Natalie Rubin is a 53 y.o. female with :    1.T2DM:Insulin resistant,well controlled, has early neuropathy. A1c at goal. Lower lantus to 44 units and increase trulicity to 2.4DT  2. Neuropathy: Recommend to add Neurontin but will like to wait  3,Obesity: recommend weight loss    Plan:      Lantus 48 units, advised self titration for glucose < 130   Continue metformin ER   Continue farxiga   Increase trulicity   If cost is an issue, switch to NPH and amaryl   Advised to check blood sugar 1 times a day   Patient to send blood sugar log for titration. Advise to exercise regularly. Advise to low simple carbohydrate and protein with each  meal diet. Diabetes Care: recommended yearly eye exam, foot exam and urine microalbumin to   creatinine ratio.  Patient is up-to-date.       -Hyperlipidemia, LDL goal is <100 mg/dl   -Hypertension:Continue same  -Daily ASA:Not indicated  -Smoking status:Non smoker    F/u in 6 months

## 2021-04-26 RX ORDER — LISINOPRIL 10 MG/1
TABLET ORAL
Qty: 30 TABLET | Refills: 12 | Status: SHIPPED | OUTPATIENT
Start: 2021-04-26 | End: 2022-05-05

## 2021-05-17 RX ORDER — INSULIN GLARGINE 100 [IU]/ML
INJECTION, SOLUTION SUBCUTANEOUS
Qty: 15 ML | Refills: 3 | Status: SHIPPED | OUTPATIENT
Start: 2021-05-17 | End: 2021-08-17

## 2021-05-17 NOTE — TELEPHONE ENCOUNTER
Medication:   Requested Prescriptions     Pending Prescriptions Disp Refills    LANTUS SOLOSTAR 100 UNIT/ML injection pen [Pharmacy Med Name: Bety Clementina PEN INJ 3ML] 15 mL 3     Sig: ADMINISTER 48 UNITS UNDER THE SKIN DAILY       Last appt: 03/18/2021  Next appt: 09/02/2021    Last Labs DM:   Lab Results   Component Value Date    LABA1C 6.2 03/18/2021

## 2021-06-28 RX ORDER — PEN NEEDLE, DIABETIC 31 GX5/16"
NEEDLE, DISPOSABLE MISCELLANEOUS
Qty: 100 EACH | Refills: 5 | Status: SHIPPED | OUTPATIENT
Start: 2021-06-28 | End: 2022-08-01 | Stop reason: SDUPTHER

## 2021-07-17 ENCOUNTER — PATIENT MESSAGE (OUTPATIENT)
Dept: ENDOCRINOLOGY | Age: 51
End: 2021-07-17

## 2021-07-17 DIAGNOSIS — Z79.4 CONTROLLED TYPE 2 DIABETES MELLITUS WITHOUT COMPLICATION, WITH LONG-TERM CURRENT USE OF INSULIN (HCC): Primary | ICD-10-CM

## 2021-07-17 DIAGNOSIS — E11.9 CONTROLLED TYPE 2 DIABETES MELLITUS WITHOUT COMPLICATION, WITH LONG-TERM CURRENT USE OF INSULIN (HCC): Primary | ICD-10-CM

## 2021-07-19 RX ORDER — DULAGLUTIDE 1.5 MG/.5ML
INJECTION, SOLUTION SUBCUTANEOUS
Qty: 4 PEN | Refills: 2 | Status: SHIPPED | OUTPATIENT
Start: 2021-07-19 | End: 2021-09-20

## 2021-07-19 NOTE — TELEPHONE ENCOUNTER
Medication:   Requested Prescriptions     Pending Prescriptions Disp Refills    Dulaglutide (TRULICITY) 1.5 .1CA SOPN 4 pen 2     Si.5mg weekly         Last appt: 3/18/2021   Next appt: 2021    Last Labs DM:   Lab Results   Component Value Date    LABA1C 6.2 2021

## 2021-07-19 NOTE — TELEPHONE ENCOUNTER
From: Allan Artist  To: Lang Bowser MD  Sent: 7/17/2021 9:15 AM EDT  Subject: Prescription Question    Dr. Lord Thompson,    I am trying to get Trulicity refilled, but [facility]y [facility]mit[facility] says that they are awaiting prescriber approval. I will be taking my last shot today.     Thanks,  Ankita Gan

## 2021-08-17 RX ORDER — INSULIN GLARGINE 100 [IU]/ML
INJECTION, SOLUTION SUBCUTANEOUS
Qty: 15 ML | Refills: 3 | Status: SHIPPED | OUTPATIENT
Start: 2021-08-17 | End: 2022-01-17

## 2021-08-17 NOTE — TELEPHONE ENCOUNTER
Medication:   Requested Prescriptions     Pending Prescriptions Disp Refills    LANTUS SOLOSTAR 100 UNIT/ML injection pen [Pharmacy Med Name: Jose Sadler PEN INJ 3ML] 15 mL 3     Sig: ADMINISTER 48 UNITS UNDER THE SKIN DAILY       Last Filled:      Patient Phone Number: [phone] (home)     Last appt: 8/3/2020   Next appt: 09/02/20021    Last Labs DM:   Lab Results   Component Value Date    LABA1C 6.2 03/18/2021

## 2021-09-20 DIAGNOSIS — E11.9 CONTROLLED TYPE 2 DIABETES MELLITUS WITHOUT COMPLICATION, WITH LONG-TERM CURRENT USE OF INSULIN (HCC): ICD-10-CM

## 2021-09-20 DIAGNOSIS — Z79.4 CONTROLLED TYPE 2 DIABETES MELLITUS WITHOUT COMPLICATION, WITH LONG-TERM CURRENT USE OF INSULIN (HCC): ICD-10-CM

## 2021-09-20 RX ORDER — DULAGLUTIDE 1.5 MG/.5ML
INJECTION, SOLUTION SUBCUTANEOUS
Qty: 2 ML | Refills: 2 | Status: SHIPPED | OUTPATIENT
Start: 2021-09-20 | End: 2021-12-03

## 2021-09-20 NOTE — TELEPHONE ENCOUNTER
NOV 12/30/2021      Requested Prescriptions     Pending Prescriptions Disp Refills    Dulaglutide (TRULICITY) 1.5 RD/4.1RZ SOPN [Pharmacy Med Name: TRULICITY 3.4WH/4.0ZY SDP 0.5ML] 2 mL      Sig: ADMINISTER 1.5 MG UNDER THE SKIN WEEKLY

## 2021-12-03 DIAGNOSIS — Z79.4 CONTROLLED TYPE 2 DIABETES MELLITUS WITHOUT COMPLICATION, WITH LONG-TERM CURRENT USE OF INSULIN (HCC): ICD-10-CM

## 2021-12-03 DIAGNOSIS — E11.9 CONTROLLED TYPE 2 DIABETES MELLITUS WITHOUT COMPLICATION, WITH LONG-TERM CURRENT USE OF INSULIN (HCC): ICD-10-CM

## 2021-12-03 RX ORDER — DULAGLUTIDE 1.5 MG/.5ML
INJECTION, SOLUTION SUBCUTANEOUS
Qty: 2 ML | Refills: 2 | Status: SHIPPED | OUTPATIENT
Start: 2021-12-03 | End: 2021-12-30 | Stop reason: SDUPTHER

## 2021-12-03 NOTE — TELEPHONE ENCOUNTER
Medication:   Requested Prescriptions     Pending Prescriptions Disp Refills    TRULICITY 1.5 IA/0.2SP SOPN [Pharmacy Med Name: Marie Left 0.5VJ/5.7ST SDP 0.5ML] 2 mL 2     Sig: ADMINISTER 1.5 MG UNDER THE SKIN WEEKLY       Last Filled:  09/20/2021    Patient Phone Number: [phone] (home)     Last appt: 3/18/2021   Next appt: 12/30/2021    Last Labs DM:   Lab Results   Component Value Date    LABA1C 6.2 03/18/2021

## 2021-12-30 ENCOUNTER — OFFICE VISIT (OUTPATIENT)
Dept: ENDOCRINOLOGY | Age: 51
End: 2021-12-30
Payer: COMMERCIAL

## 2021-12-30 VITALS
DIASTOLIC BLOOD PRESSURE: 60 MMHG | WEIGHT: 193.2 LBS | OXYGEN SATURATION: 97 % | BODY MASS INDEX: 32.98 KG/M2 | HEIGHT: 64 IN | SYSTOLIC BLOOD PRESSURE: 100 MMHG | HEART RATE: 118 BPM

## 2021-12-30 DIAGNOSIS — E11.9 CONTROLLED TYPE 2 DIABETES MELLITUS WITHOUT COMPLICATION, WITH LONG-TERM CURRENT USE OF INSULIN (HCC): Primary | ICD-10-CM

## 2021-12-30 DIAGNOSIS — Z79.4 CONTROLLED TYPE 2 DIABETES MELLITUS WITHOUT COMPLICATION, WITH LONG-TERM CURRENT USE OF INSULIN (HCC): Primary | ICD-10-CM

## 2021-12-30 DIAGNOSIS — G62.9 NEUROPATHY: ICD-10-CM

## 2021-12-30 LAB
CREATININE URINE: 79.5 MG/DL (ref 28–259)
HBA1C MFR BLD: 6.4 %
MICROALBUMIN UR-MCNC: <1.2 MG/DL
MICROALBUMIN/CREAT UR-RTO: NORMAL MG/G (ref 0–30)

## 2021-12-30 PROCEDURE — 83036 HEMOGLOBIN GLYCOSYLATED A1C: CPT | Performed by: INTERNAL MEDICINE

## 2021-12-30 PROCEDURE — 99214 OFFICE O/P EST MOD 30 MIN: CPT | Performed by: INTERNAL MEDICINE

## 2021-12-30 RX ORDER — DULAGLUTIDE 1.5 MG/.5ML
INJECTION, SOLUTION SUBCUTANEOUS
Qty: 2 ML | Refills: 3 | Status: SHIPPED | OUTPATIENT
Start: 2021-12-30 | End: 2022-04-28 | Stop reason: SDUPTHER

## 2021-12-30 RX ORDER — BLOOD SUGAR DIAGNOSTIC
1 STRIP MISCELLANEOUS DAILY
Qty: 100 EACH | Refills: 3 | Status: SHIPPED | OUTPATIENT
Start: 2021-12-30

## 2021-12-30 RX ORDER — BLOOD-GLUCOSE METER
EACH MISCELLANEOUS
Qty: 1 KIT | Refills: 0 | Status: SHIPPED | OUTPATIENT
Start: 2021-12-30

## 2021-12-30 NOTE — PROGRESS NOTES
Seen as f/u patient for diabetes      Interim:     stable  Taking trulicity  6 lb loss      Diagnosed with Type 2 diabetes mellitus in  2010  Known diabetic complications: neuropathy    Current diabetic medications   Metformin ER 2 BID  Lantus 44 units    Farxiga 5mg  trulicity 1.5    She has tried Janyth Kelch in the past.    Intolerance to diabetes medications: yes - Metformin plain form    Mild, controlled    Last A1c 6.2%<-----7.2%<----6.6% <---- 6.9%<------ 6.4%<----- 6.3%<---- 6.2% on 4/17<---- 6.4 on 4/16<------5.8 on 9/15<---- 5.7 on 3/15<---6.0 on 8/14<-----6.5 on 2/14<---8.0 on 11/13<---9.5 on 7/13<--- 9.0<--- 6.5    Prior visit with dietician: Yes   Current diet: on average, 3 meals per day   Current exercise: walking   Current monitoring regimen: home blood tests -occ    Has brought blood glucose log/meter: no  Home blood sugar records: 100s  Any episodes of hypoglycemia? occ    No Hx of CAD , PVD, CVA    Hyperlipidemia: LDL 68 o n 7/13---> LDL 66 on 5/18  LDL 84 on 12/20  Last eye exam: 12/21  Last foot exam: 12/21  Last microalbumin to creatinine ratio: 11/20  ACE-I or ARB: Lisinopril 10mg    Neuropathy improved, stable    Past Medical History:   Diagnosis Date    Chronic diarrhea     Depression     Insomnia     Type II or unspecified type diabetes mellitus without mention of complication, not stated as uncontrolled      Past Surgical History:   Procedure Laterality Date    BREAST REDUCTION SURGERY  1990    CHOLECYSTECTOMY      ESOPHAGEAL DILATATION N/A 8/14/2019    ESOPHAGEAL DILATION Kiko Dings performed by Daisy Hugo MD at [facility] ENDOSCOPY N/A 8/14/2019    EGD BIOPSY performed by Daisy Hugo MD at [facility] ENDOSCOPY     Current Outpatient Medications   Medication Sig Dispense Refill    TRULICITY 1.5 CF/0.0IR SOPN ADMINISTER 1.5 MG UNDER THE SKIN WEEKLY 2 mL 2    LANTUS SOLOSTAR 100 UNIT/ML injection pen ADMINISTER 48 UNITS UNDER THE SKIN DAILY 15 mL 3    B-D ULTRAFINE III SHORT PEN 31G X 8 MM MISC USE DAILY AS DIRECTED 100 each 5    lisinopril (PRINIVIL;ZESTRIL) 10 MG tablet TAKE 1 TABLET BY MOUTH DAILY 30 tablet 12    metFORMIN (GLUCOPHAGE-XR) 500 MG extended release tablet TAKE 2 TABLETS BY MOUTH BEFORE BREAKFAST AND DINNER 360 tablet 3    FARXIGA 5 MG tablet TAKE 1 TABLET BY MOUTH EVERY MORNING 90 tablet 3    blood glucose test strips (ACCU-CHEK GUIDE) strip 1 each by In Vitro route daily As needed. 100 each 3    blood glucose test strips (ACCU-CHEK LUCIE) strip 1 each by In Vitro route daily As needed. 100 each 3    Alpha-Lipoic Acid 600 MG TABS Take 600 mg of ampicillin by mouth daily      ACCU-CHEK FASTCLIX LANCETS MISC 1-2 times a day 100 each 2    Multiple Vitamins-Minerals (MULTIPLE VITAMINS/WOMENS PO) Take  by mouth daily.  aspirin 81 MG tablet Take 81 mg by mouth daily.  glucose blood VI test strips (TRUETEST TEST) strip Pt tests bid 100 strip 12     No current facility-administered medications for this visit. Review of Systems  Scanned, reviewed    Vitals:    12/30/21 1306   BP: 100/60   Pulse: 118   SpO2: 97%       Constitutional: Well-developed, appears stated age, cooperative, in no acute distress  H/E/N/M/T:atraumatic, normocephalic, external ears, nose, lips normal without lesions  Eyes: Lids, lashes, conjunctivae and sclerae normal, No proptosis, no redness  Neck: supple, symmetrical, no swelling  Skin: No obvious rashes or lesions present.   Skin and hair texture normal  Psychiatric: Judgement and Insight:  judgement and insight appear normal  Neuro: Normal without focal findings, speech is normal normal, speech is spontaneous  Chest: No labored breathing, no chest deformity, no stridor  Musculoskeletal: No joint deformity, swelling      12/21  Skeletal foot exam is normal, no skin lesions, toenails are normal, 10 g monofilament is 8/10 on the right and 8/10 on the left , 128 Hz vibration sense is nl bilaterally. Lab Reviewed   No components found for: CHLPL  Lab Results   Component Value Date    TRIG 133 12/21/2020    TRIG 221 (H) 06/17/2020    TRIG 155 (H) 06/05/2019     Lab Results   Component Value Date    HDL 53 12/21/2020    HDL 53 06/17/2020    HDL 54 06/05/2019     Lab Results   Component Value Date    LDLCALC 84 12/21/2020    LDLCALC 51 06/17/2020    LDLCALC 70 06/05/2019     Lab Results   Component Value Date    LABVLDL 27 12/21/2020    LABVLDL 44 06/17/2020    LABVLDL 31 06/05/2019     Lab Results   Component Value Date    LABA1C 6.2 03/18/2021       Assessment:     Bobby Lazar is a 49 y.o. female with :    1.T2DM:Insulin resistant,well controlled, has early neuropathy. A1c at goal. Lantus 44 units and  trulicity  3.8RZ  2. Neuropathy: Recommend to add Neurontin but will like to wait. Now resolved  3,Obesity: recommend weight loss    Plan:      Lantus 44 units, advised self titration for glucose < 130   Continue metformin ER   Continue farxiga    trulicity   If cost is an issue, switch to NPH and amaryl   Advised to check blood sugar 1 times a day   Patient to send blood sugar log for titration. Advise to exercise regularly. Advise to low simple carbohydrate and protein with each  meal diet. Diabetes Care: recommended yearly eye exam, foot exam and urine microalbumin to   creatinine ratio.  Patient is up-to-date.       -Hyperlipidemia, LDL goal is <100 mg/dl   -Hypertension:Continue same  -Daily ASA:Not indicated  -Smoking status:Non smoker    F/u in 3  Months as will be seeing PCP prn

## 2022-01-17 RX ORDER — INSULIN GLARGINE 100 [IU]/ML
INJECTION, SOLUTION SUBCUTANEOUS
Qty: 15 ML | Refills: 3 | Status: SHIPPED | OUTPATIENT
Start: 2022-01-17 | End: 2022-06-06

## 2022-01-17 NOTE — TELEPHONE ENCOUNTER
Medication:   Requested Prescriptions     Pending Prescriptions Disp Refills    LANTUS SOLOSTAR 100 UNIT/ML injection pen [Pharmacy Med Name: Tyshawn Irwin PEN INJ 3ML] 15 mL 3     Sig: ADMINISTER 48 UNITS UNDER THE SKIN DAILY       Last Filled:      Patient Phone Number: [phone] (home)     Last appt: 8/3/2020   Next appt: 04/28/2022  Last Labs DM:   Lab Results   Component Value Date    LABA1C 6.4 12/30/2021

## 2022-01-26 ENCOUNTER — HOSPITAL ENCOUNTER (OUTPATIENT)
Dept: WOMENS IMAGING | Age: 52
Discharge: HOME | End: 2022-01-26
Payer: COMMERCIAL

## 2022-01-26 DIAGNOSIS — Z12.31 VISIT FOR SCREENING MAMMOGRAM: ICD-10-CM

## 2022-01-26 PROCEDURE — 77063 BREAST TOMOSYNTHESIS BI: CPT

## 2022-03-01 RX ORDER — DAPAGLIFLOZIN 5 MG/1
5 TABLET, FILM COATED ORAL EVERY MORNING
Qty: 90 TABLET | Refills: 3 | Status: SHIPPED | OUTPATIENT
Start: 2022-03-01

## 2022-03-01 NOTE — TELEPHONE ENCOUNTER
Medication:   Requested Prescriptions     Pending Prescriptions Disp Refills    FARXIGA 5 MG tablet [Pharmacy Med Name: Dona Armenta 5MG TABLETS] 90 tablet 3     Sig: TAKE 1 TABLET BY MOUTH EVERY MORNING       Last Filled:      Patient Phone Number: [phone] (home)     Last appt: 12/30/2021   Next appt: 04/28/2022  Last Labs DM:   Lab Results   Component Value Date    LABA1C 6.4 12/30/2021

## 2022-03-15 RX ORDER — METFORMIN HYDROCHLORIDE 500 MG/1
TABLET, EXTENDED RELEASE ORAL
Qty: 360 TABLET | Refills: 3 | Status: SHIPPED | OUTPATIENT
Start: 2022-03-15

## 2022-03-15 NOTE — TELEPHONE ENCOUNTER
Medication:   Requested Prescriptions     Pending Prescriptions Disp Refills    metFORMIN (GLUCOPHAGE-XR) 500 MG extended release tablet [Pharmacy Med Name: METFORMIN ER 500MG 24HR TABS] 360 tablet 3     Sig: TAKE 2 TABLETS BY MOUTH BEFORE BREAKFAST AND DINNER       Last Filled:      Patient Phone Number: [phone] (home)     Last appt: 12/30/2021   Next appt: 04/28/2022    Last Labs DM:   Lab Results   Component Value Date    LABA1C 6.4 12/30/2021

## 2022-04-28 ENCOUNTER — OFFICE VISIT (OUTPATIENT)
Dept: ENDOCRINOLOGY | Age: 52
End: 2022-04-28
Payer: COMMERCIAL

## 2022-04-28 VITALS
OXYGEN SATURATION: 97 % | HEIGHT: 64 IN | BODY MASS INDEX: 32.74 KG/M2 | HEART RATE: 67 BPM | SYSTOLIC BLOOD PRESSURE: 118 MMHG | WEIGHT: 191.8 LBS | DIASTOLIC BLOOD PRESSURE: 64 MMHG

## 2022-04-28 DIAGNOSIS — G62.9 NEUROPATHY: ICD-10-CM

## 2022-04-28 DIAGNOSIS — Z79.4 CONTROLLED TYPE 2 DIABETES MELLITUS WITHOUT COMPLICATION, WITH LONG-TERM CURRENT USE OF INSULIN (HCC): Primary | ICD-10-CM

## 2022-04-28 DIAGNOSIS — E11.9 CONTROLLED TYPE 2 DIABETES MELLITUS WITHOUT COMPLICATION, WITH LONG-TERM CURRENT USE OF INSULIN (HCC): Primary | ICD-10-CM

## 2022-04-28 LAB — HBA1C MFR BLD: 6.5 %

## 2022-04-28 PROCEDURE — 3044F HG A1C LEVEL LT 7.0%: CPT | Performed by: INTERNAL MEDICINE

## 2022-04-28 PROCEDURE — 83036 HEMOGLOBIN GLYCOSYLATED A1C: CPT | Performed by: INTERNAL MEDICINE

## 2022-04-28 PROCEDURE — 99214 OFFICE O/P EST MOD 30 MIN: CPT | Performed by: INTERNAL MEDICINE

## 2022-04-28 RX ORDER — DULAGLUTIDE 1.5 MG/.5ML
INJECTION, SOLUTION SUBCUTANEOUS
Qty: 2 ML | Refills: 3 | Status: SHIPPED | OUTPATIENT
Start: 2022-04-28 | End: 2022-05-05

## 2022-04-28 NOTE — PROGRESS NOTES
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    Intolerance to diabetes medications: yes - Metformin plain form    Mild, controlled    Last A1c 6.5%<-----6.2%<-----7.2%<----6.6% <---- 6.9%<------ 6.4%<----- 6.3%<---- 6.2% on 4/17<---- 6.4 on 4/16<------5.8 on 9/15<---- 5.7 on 3/15<---6.0 on 8/14<-----6.5 on 2/14<---8.0 on 11/13<---9.5 on 7/13<--- 9.0<--- 6.5    Prior visit with dietician: Yes   Current diet: on average, 3 meals per day   Current exercise: walking   Current monitoring regimen: home blood tests -occ    Has brought blood glucose log/meter: no  Home blood sugar records: 100s  Any episodes of hypoglycemia? occ    No Hx of CAD , PVD, CVA    Hyperlipidemia: LDL 68 o n 7/13---> LDL 66 on 5/18  LDL 84 on 12/20  Last eye exam: 12/21  Last foot exam: 12/21  Last microalbumin to creatinine ratio: 12/21  ACE-I or ARB: Lisinopril 10mg    Neuropathy improved, stable    Past Medical History:   Diagnosis Date    Chronic diarrhea     Depression     Insomnia     Type II or unspecified type diabetes mellitus without mention of complication, not stated as uncontrolled      Past Surgical History:   Procedure Laterality Date    BREAST REDUCTION SURGERY  1990    CHOLECYSTECTOMY      ESOPHAGEAL DILATATION N/A 8/14/2019    ESOPHAGEAL DILATION Kalpana Krishna performed by Harman Lewis MD [facility] [address] [facility]OSCOPY N/A 8/14/2019    EGD BIOPSY performed by Hraman Lewis MD at [facility] ENDOSCOPY     Current Outpatient Medications   Medication Sig Dispense Refill    metFORMIN (GLUCOPHAGE-XR) 500 MG extended release tablet TAKE 2 TABLETS BY MOUTH BEFORE BREAKFAST AND DINNER 360 tablet 3    FARXIGA 5 MG tablet TAKE 1 TABLET BY MOUTH EVERY MORNING 90 tablet 3    LANTUS SOLOSTAR 100 UNIT/ML injection pen ADMINISTER 48 UNITS UNDER THE SKIN DAILY 15 mL 3    blood glucose test strips (ACCU-CHEK GUIDE) strip 1 each by In Vitro route daily As needed. 100 each 3    Blood Glucose Monitoring Suppl (ACCU-CHEK GUIDE) w/Device KIT As needed 1 kit 0    Dulaglutide (TRULICITY) 1.5 WI/8.2UM SOPN 1.5mg weekly 2 mL 3    B-D ULTRAFINE III SHORT PEN 31G X 8 MM MISC USE DAILY AS DIRECTED 100 each 5    lisinopril (PRINIVIL;ZESTRIL) 10 MG tablet TAKE 1 TABLET BY MOUTH DAILY 30 tablet 12    blood glucose test strips (ACCU-CHEK LUCIE) strip 1 each by In Vitro route daily As needed. 100 each 3    Alpha-Lipoic Acid 600 MG TABS Take 600 mg of ampicillin by mouth daily      ACCU-CHEK FASTCLIX LANCETS MISC 1-2 times a day 100 each 2    Multiple Vitamins-Minerals (MULTIPLE VITAMINS/WOMENS PO) Take  by mouth daily.  aspirin 81 MG tablet Take 81 mg by mouth daily.  glucose blood VI test strips (TRUETEST TEST) strip Pt tests bid 100 strip 12     No current facility-administered medications for this visit. Review of Systems  Scanned, reviewed    Vitals:    04/28/22 1636   BP: 118/64   Pulse: 67   SpO2: 97%       Constitutional: Well-developed, appears stated age, cooperative, in no acute distress  H/E/N/M/T:atraumatic, normocephalic, external ears, nose, lips normal without lesions  Eyes: Lids, lashes, conjunctivae and sclerae normal, No proptosis, no redness  Neck: supple, symmetrical, no swelling  Skin: No obvious rashes or lesions present.   Skin and hair texture normal  Psychiatric: Judgement and Insight:  judgement and insight appear normal  Neuro: Normal without focal findings, speech is normal normal, speech is spontaneous  Chest: No labored breathing, no chest deformity, no stridor  Musculoskeletal: No joint deformity, swelling      12/21  Skeletal foot exam is normal, no skin lesions, toenails are normal, 10 g monofilament is 8/10 on the right and 8/10 on the left , 128 Hz vibration sense is nl bilaterally. Lab Reviewed   No components found for: CHLPL  Lab Results   Component Value Date    TRIG 133 12/21/2020    TRIG 221 (H) 06/17/2020    TRIG 155 (H) 06/05/2019     Lab Results   Component Value Date    HDL 53 12/21/2020    HDL 53 06/17/2020    HDL 54 06/05/2019     Lab Results   Component Value Date    LDLCALC 84 12/21/2020    LDLCALC 51 06/17/2020    LDLCALC 70 06/05/2019     Lab Results   Component Value Date    LABVLDL 27 12/21/2020    LABVLDL 44 06/17/2020    LABVLDL 31 06/05/2019     Lab Results   Component Value Date    LABA1C 6.4 12/30/2021       Assessment:     Marie Womack is a 49 y.o. female with :    1.T2DM:Insulin resistant,well controlled, has early neuropathy. A1c at goal. Lantus 44 units and  trulicity  6.2TW  2. Neuropathy: Recommend to add Neurontin but will like to wait. Now resolved  3,Obesity: recommend weight loss    Plan:      Lantus 44 units, advised self titration for glucose < 130   Continue metformin ER   Continue farxiga    trulicity   If cost is an issue, switch to NPH and amaryl   Advised to check blood sugar 1 times a day   Patient to send blood sugar log for titration. Advise to exercise regularly. Advise to low simple carbohydrate and protein with each  meal diet. Diabetes Care: recommended yearly eye exam, foot exam and urine microalbumin to   creatinine ratio.  Patient is up-to-date.       -Hyperlipidemia, LDL goal is <100 mg/dl   -Hypertension:Continue same  -Daily ASA:Not indicated  -Smoking status:Non smoker    F/u in 3  Months as will be seeing PCP prn    PCP: Ramiro Alvarez

## 2022-05-05 DIAGNOSIS — E11.9 CONTROLLED TYPE 2 DIABETES MELLITUS WITHOUT COMPLICATION, WITH LONG-TERM CURRENT USE OF INSULIN (HCC): ICD-10-CM

## 2022-05-05 DIAGNOSIS — Z79.4 CONTROLLED TYPE 2 DIABETES MELLITUS WITHOUT COMPLICATION, WITH LONG-TERM CURRENT USE OF INSULIN (HCC): ICD-10-CM

## 2022-05-05 RX ORDER — DULAGLUTIDE 1.5 MG/.5ML
INJECTION, SOLUTION SUBCUTANEOUS
Qty: 2 ML | Refills: 3 | Status: SHIPPED | OUTPATIENT
Start: 2022-05-05 | End: 2022-08-29

## 2022-05-05 RX ORDER — LISINOPRIL 10 MG/1
TABLET ORAL
Qty: 90 TABLET | Refills: 1 | Status: SHIPPED | OUTPATIENT
Start: 2022-05-05

## 2022-05-05 NOTE — TELEPHONE ENCOUNTER
Medication:   Requested Prescriptions     Pending Prescriptions Disp Refills    Dulaglutide (TRULICITY) 1.5 TL/2.5YY SOPN [Pharmacy Med Name: Luna Richardson 6.1AS/2.6TM SDP 0.5ML] 2 mL 3     Sig: ADMINISTER 1.5 MG UNDER THE SKIN WEEKLY       Last Filled:      Patient Phone Number: [phone] (home)     Last appt: 12/30/2021   Next appt: 09/01/2022  Last Labs DM:   Lab Results   Component Value Date    LABA1C 6.5 04/28/2022

## 2022-05-05 NOTE — TELEPHONE ENCOUNTER
Medication:   Requested Prescriptions     Pending Prescriptions Disp Refills    lisinopril (PRINIVIL;ZESTRIL) 10 MG tablet [Pharmacy Med Name: LISINOPRIL 10MG TABLETS] 90 tablet 1     Sig: TAKE 1 TABLET BY MOUTH DAILY       Last Filled:      Patient Phone Number: [phone] (home)     Last appt: 4/28/2022   Next appt: 9/1/2022    Last Labs DM:   Lab Results   Component Value Date    LABA1C 6.5 04/28/2022     Last Lipid:   Lab Results   Component Value Date    CHOL 164 12/21/2020    TRIG 133 12/21/2020    HDL 53 12/21/2020    HDL 43 11/21/2011    LDLCALC 84 12/21/2020     Last PSA: No results found for: PSA  Last Thyroid:   Lab Results   Component Value Date    TSH 1.85 06/05/2019

## 2022-06-06 RX ORDER — INSULIN GLARGINE 100 [IU]/ML
INJECTION, SOLUTION SUBCUTANEOUS
Qty: 15 ML | Refills: 3 | Status: SHIPPED | OUTPATIENT
Start: 2022-06-06 | End: 2022-10-18

## 2022-07-31 ENCOUNTER — PATIENT MESSAGE (OUTPATIENT)
Dept: ENDOCRINOLOGY | Age: 52
End: 2022-07-31

## 2022-07-31 DIAGNOSIS — E11.9 CONTROLLED TYPE 2 DIABETES MELLITUS WITHOUT COMPLICATION, WITH LONG-TERM CURRENT USE OF INSULIN (HCC): Primary | ICD-10-CM

## 2022-07-31 DIAGNOSIS — Z79.4 CONTROLLED TYPE 2 DIABETES MELLITUS WITHOUT COMPLICATION, WITH LONG-TERM CURRENT USE OF INSULIN (HCC): Primary | ICD-10-CM

## 2022-08-01 RX ORDER — PEN NEEDLE, DIABETIC 31 GX5/16"
NEEDLE, DISPOSABLE MISCELLANEOUS
Qty: 100 EACH | Refills: 5 | Status: SHIPPED | OUTPATIENT
Start: 2022-08-01

## 2022-08-01 NOTE — TELEPHONE ENCOUNTER
From: [facility]  To: Dr. Alfred American: 7/31/2022 3:19 PM EDT  Subject: Needle Prescription    Dr. Ghada Castellano,    Can you please send in a prescription for the needles below? Dr. Zulma Bhandari is the doctor that prescribed them and she has retired.     B-d Pen Ndl Shrt 21ak1mp(5/16) Robbin  Qty: 100     Thanks,  Jimena Covarrubias

## 2022-08-27 DIAGNOSIS — E11.9 CONTROLLED TYPE 2 DIABETES MELLITUS WITHOUT COMPLICATION, WITH LONG-TERM CURRENT USE OF INSULIN (HCC): ICD-10-CM

## 2022-08-27 DIAGNOSIS — Z79.4 CONTROLLED TYPE 2 DIABETES MELLITUS WITHOUT COMPLICATION, WITH LONG-TERM CURRENT USE OF INSULIN (HCC): ICD-10-CM

## 2022-08-29 RX ORDER — DULAGLUTIDE 1.5 MG/.5ML
INJECTION, SOLUTION SUBCUTANEOUS
Qty: 2 ML | Refills: 3 | Status: SHIPPED | OUTPATIENT
Start: 2022-08-29

## 2022-08-29 NOTE — TELEPHONE ENCOUNTER
Medication:   Requested Prescriptions     Pending Prescriptions Disp Refills    TRULICITY 1.5 FC/9.1CT SOPN [Pharmacy Med Name: Gerri Grover 8.9WX/9.9AM SDP 0.5ML] 2 mL 3     Sig: ADMINISTER 1.5 MG UNDER THE SKIN WEEKLY       Last Filled:      Patient Phone Number: [phone] (home)     Last appt: 4/28/2022   Next appt: 9/1/2022    Last Labs DM:   Lab Results   Component Value Date/Time    LABA1C 6.5 04/28/2022 04:50 PM

## 2022-09-01 ENCOUNTER — OFFICE VISIT (OUTPATIENT)
Dept: ENDOCRINOLOGY | Age: 52
End: 2022-09-01
Payer: COMMERCIAL

## 2022-09-01 VITALS
OXYGEN SATURATION: 98 % | SYSTOLIC BLOOD PRESSURE: 118 MMHG | WEIGHT: 193.6 LBS | HEIGHT: 64 IN | DIASTOLIC BLOOD PRESSURE: 76 MMHG | HEART RATE: 101 BPM | BODY MASS INDEX: 33.05 KG/M2

## 2022-09-01 DIAGNOSIS — E11.9 CONTROLLED TYPE 2 DIABETES MELLITUS WITHOUT COMPLICATION, WITH LONG-TERM CURRENT USE OF INSULIN (HCC): Primary | ICD-10-CM

## 2022-09-01 DIAGNOSIS — Z79.4 CONTROLLED TYPE 2 DIABETES MELLITUS WITHOUT COMPLICATION, WITH LONG-TERM CURRENT USE OF INSULIN (HCC): Primary | ICD-10-CM

## 2022-09-01 LAB — HBA1C MFR BLD: 6.8 %

## 2022-09-01 PROCEDURE — 83036 HEMOGLOBIN GLYCOSYLATED A1C: CPT | Performed by: INTERNAL MEDICINE

## 2022-09-01 PROCEDURE — 99214 OFFICE O/P EST MOD 30 MIN: CPT | Performed by: INTERNAL MEDICINE

## 2022-09-01 PROCEDURE — 3044F HG A1C LEVEL LT 7.0%: CPT | Performed by: INTERNAL MEDICINE

## 2022-09-01 NOTE — PROGRESS NOTES
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    Intolerance to diabetes medications: yes - Metformin plain form    Mild, controlled    Last A1c 6.8% <----6.5%<-----6.2%<-----7.2%<----6.6% <---- 6.9%<------ 6.4%<----- 6.3%<---- 6.2% on 4/17<---- 6.4 on 4/16<------5.8 on 9/15<---- 5.7 on 3/15<---6.0 on 8/14<-----6.5 on 2/14<---8.0 on 11/13<---9.5 on 7/13<--- 9.0<--- 6.5    Prior visit with dietician: Yes   Current diet: on average, 3 meals per day   Current exercise: walking   Current monitoring regimen: home blood tests -occ    Has brought blood glucose log/meter: no  Home blood sugar records: 100s  Any episodes of hypoglycemia? occ    No Hx of CAD , PVD, CVA    Hyperlipidemia: LDL 68 o n 7/13---> LDL 66 on 5/18  LDL 84 on 12/20  LDL 84 on 8/22  Last eye exam: 12/21  Last foot exam: 12/21  Last microalbumin to creatinine ratio: 12/21  ACE-I or ARB: Lisinopril 10mg    Neuropathy improved, stable    Past Medical History:   Diagnosis Date    Chronic diarrhea     Depression     Insomnia     Type II or unspecified type diabetes mellitus without mention of complication, not stated as uncontrolled      Past Surgical History:   Procedure Laterality Date    BREAST REDUCTION SURGERY  1990    CHOLECYSTECTOMY      ESOPHAGEAL DILATION N/A 8/14/2019    ESOPHAGEAL DILATION [place] performed by Silver López MD at [address] [place] [address] ENDOSCOPY N/A 8/14/2019    EGD BIOPSY performed by Silver López MD at [facility] ENDOSCOPY     Current Outpatient Medications   Medication Sig Dispense Refill    TRULICITY 1.5 NZ/1.6NL SOPN ADMINISTER 1.5 MG UNDER THE SKIN WEEKLY 2 mL 3    Insulin Pen Needle (B-D ULTRAFINE III SHORT PEN) 31G X 8 MM MISC USE DAILY AS DIRECTED 100 each 5    LANTUS SOLOSTAR 100 UNIT/ML injection pen ADMINISTER 48 UNITS UNDER THE SKIN DAILY 15 mL 3    lisinopril (PRINIVIL;ZESTRIL) 10 MG tablet TAKE 1 TABLET BY MOUTH DAILY 90 tablet 1    metFORMIN (GLUCOPHAGE-XR) 500 MG extended release tablet TAKE 2 TABLETS BY MOUTH BEFORE BREAKFAST AND DINNER 360 tablet 3    FARXIGA 5 MG tablet TAKE 1 TABLET BY MOUTH EVERY MORNING 90 tablet 3    blood glucose test strips (ACCU-CHEK GUIDE) strip 1 each by In Vitro route daily As needed. 100 each 3    Blood Glucose Monitoring Suppl (ACCU-CHEK GUIDE) w/Device KIT As needed 1 kit 0    blood glucose test strips (ACCU-CHEK LUCIE) strip 1 each by In Vitro route daily As needed. 100 each 3    Alpha-Lipoic Acid 600 MG TABS Take 600 mg of ampicillin by mouth daily      ACCU-CHEK FASTCLIX LANCETS MISC 1-2 times a day 100 each 2    Multiple Vitamins-Minerals (MULTIPLE VITAMINS/WOMENS PO) Take  by mouth daily. aspirin 81 MG tablet Take 81 mg by mouth daily. glucose blood VI test strips (TRUETEST TEST) strip Pt tests bid 100 strip 12     No current facility-administered medications for this visit. Review of Systems  Scanned, reviewed    Vitals:    09/01/22 1650   BP: 118/76   Pulse: (!) 101   SpO2: 98%       Constitutional: Well-developed, appears stated age, cooperative, in no acute distress  H/E/N/M/T:atraumatic, normocephalic, external ears, nose, lips normal without lesions  Eyes: Lids, lashes, conjunctivae and sclerae normal, No proptosis, no redness  Neck: supple, symmetrical, no swelling  Skin: No obvious rashes or lesions present.   Skin and hair texture normal  Psychiatric: Judgement and Insight:  judgement and insight appear normal  Neuro: Normal without focal findings, speech is normal normal, speech is spontaneous  Chest: No labored breathing, no chest deformity, no stridor  Musculoskeletal: No joint deformity, swelling      12/21  Skeletal foot exam is normal, no skin lesions, toenails are normal, 10 g monofilament is 8/10 on the right and 8/10 on the left , 128 Hz vibration sense is nl bilaterally. Lab Reviewed   No components found for: CHLPL  Lab Results   Component Value Date    TRIG 133 12/21/2020    TRIG 221 (H) 06/17/2020    TRIG 155 (H) 06/05/2019     Lab Results   Component Value Date    HDL 53 12/21/2020    HDL 53 06/17/2020    HDL 54 06/05/2019     Lab Results   Component Value Date    LDLCALC 84 12/21/2020    LDLCALC 51 06/17/2020    LDLCALC 70 06/05/2019     Lab Results   Component Value Date    LABVLDL 27 12/21/2020    LABVLDL 44 06/17/2020    LABVLDL 31 06/05/2019     Lab Results   Component Value Date    LABA1C 6.5 04/28/2022       Assessment:     Angelica Pendleton is a 47 y.o. female with :    1.T2DM:Insulin resistant,well controlled, has early neuropathy. A1c at goal. Lantus 44 units and  trulicity  8.8YJ continue same dose  2. Neuropathy: Recommend to add Neurontin but she will like to wait. Now resolved  3,Obesity: recommend weight loss    Plan:      Lantus 44 units, advised self titration for glucose < 130   Continue metformin ER   Continue farxiga    trulicity   If cost is an issue, switch to NPH and amaryl   Advised to check blood sugar 1 times a day   Patient to send blood sugar log for titration. Advise to exercise regularly. Advise to low simple carbohydrate and protein with each  meal diet. Diabetes Care: recommended yearly eye exam, foot exam and urine microalbumin to   creatinine ratio.  Patient is up-to-date.       -Hyperlipidemia, LDL goal is <100 mg/dl   -Hypertension:Continue same  -Daily ASA:Not indicated  -Smoking status:Non smoker    F/u in 3  Months as will be seeing PCP prn    PCP: Veola Dakin

## 2022-09-23 NOTE — PROGRESS NOTES
[facility] ENDOSCOPY COLONOSCOPY PRE-OPERATIVE INSTRUCTIONS    Procedure date___9/30/2022______  Arrival time____0930________          Surgery time___1030_________       Clear liquids the day before the procedure. Do not eat or drink anything within 5 hours of your procedure. This includes water chewing gum, mints and ice chips. You may brush your teeth and gargle the morning of your surgery, but do not swallow the water    You may be asked to stop blood thinners such as Coumadin, Plavix, Fragmin, Lovenox, etc., or any anti-inflammatories such as:  Aspirin, Ibuprofen, Advil, Naproxen prior to your procedure. We also ask that you stop any OTC medications such as fish oil, vitamin E, glucosamine, garlic, Multivitamins, COQ 10, etc.    You must make arrangements for a responsible adult to arrive with you and stay in our waiting area during your procedure. They will also need to take you home after your procedure. For your safety you will not be allowed to leave alone or drive yourself home. Also for your safety, it is strongly suggested that someone stay with you the first 24 hours after your procedure. For your comfort, please wear simple loose fitting clothing to the center. Please do not bring valuables. If you have a living will and a durable power of  for healthcare, please bring in a copy. You will need to bring a photo ID and insurance card    Our goal is to provide you with excellent care so if you have any questions, please contact us at the [facility] at [phone]         Please note these are generalized instructions for all colonoscopy cases, you may be provided with more specific instructions if necessary     Recommended patient to call MD that prescribes diabetes medication to see how they wanted her to proceed with diabetes meds since she will not be eating breakfast.  Pt v/u.

## 2022-09-29 ENCOUNTER — ANESTHESIA EVENT (OUTPATIENT)
Dept: ENDOSCOPY | Age: 52
End: 2022-09-29
Payer: COMMERCIAL

## 2022-09-29 ASSESSMENT — LIFESTYLE VARIABLES: SMOKING_STATUS: 0

## 2022-09-30 ENCOUNTER — HOSPITAL ENCOUNTER (OUTPATIENT)
Age: 52
Setting detail: OUTPATIENT SURGERY
Discharge: HOME | End: 2022-09-30
Attending: INTERNAL MEDICINE | Admitting: INTERNAL MEDICINE
Payer: COMMERCIAL

## 2022-09-30 ENCOUNTER — ANESTHESIA (OUTPATIENT)
Dept: ENDOSCOPY | Age: 52
End: 2022-09-30
Payer: COMMERCIAL

## 2022-09-30 VITALS
TEMPERATURE: 97.1 F | RESPIRATION RATE: 18 BRPM | BODY MASS INDEX: 32.44 KG/M2 | OXYGEN SATURATION: 99 % | SYSTOLIC BLOOD PRESSURE: 119 MMHG | HEART RATE: 76 BPM | DIASTOLIC BLOOD PRESSURE: 60 MMHG | WEIGHT: 190 LBS | HEIGHT: 64 IN

## 2022-09-30 LAB
GLUCOSE BLD-MCNC: 107 MG/DL (ref 70–99)
PERFORMED ON: ABNORMAL

## 2022-09-30 PROCEDURE — 2500000003 HC RX 250 WO HCPCS

## 2022-09-30 PROCEDURE — 3700000000 HC ANESTHESIA ATTENDED CARE: Performed by: INTERNAL MEDICINE

## 2022-09-30 PROCEDURE — 3609027000 HC COLONOSCOPY: Performed by: INTERNAL MEDICINE

## 2022-09-30 PROCEDURE — 3700000001 HC ADD 15 MINUTES (ANESTHESIA): Performed by: INTERNAL MEDICINE

## 2022-09-30 PROCEDURE — 2580000003 HC RX 258: Performed by: ANESTHESIOLOGY

## 2022-09-30 PROCEDURE — 7100000010 HC PHASE II RECOVERY - FIRST 15 MIN: Performed by: INTERNAL MEDICINE

## 2022-09-30 PROCEDURE — 6360000002 HC RX W HCPCS

## 2022-09-30 PROCEDURE — 7100000011 HC PHASE II RECOVERY - ADDTL 15 MIN: Performed by: INTERNAL MEDICINE

## 2022-09-30 RX ORDER — SODIUM CHLORIDE 0.9 % (FLUSH) 0.9 %
5-40 SYRINGE (ML) INJECTION EVERY 12 HOURS SCHEDULED
Status: DISCONTINUED | OUTPATIENT
Start: 2022-09-30 | End: 2022-09-30 | Stop reason: HOSPADM

## 2022-09-30 RX ORDER — LIDOCAINE HYDROCHLORIDE 20 MG/ML
INJECTION, SOLUTION EPIDURAL; INFILTRATION; INTRACAUDAL; PERINEURAL PRN
Status: DISCONTINUED | OUTPATIENT
Start: 2022-09-30 | End: 2022-09-30 | Stop reason: SDUPTHER

## 2022-09-30 RX ORDER — SODIUM CHLORIDE 9 MG/ML
INJECTION, SOLUTION INTRAVENOUS PRN
Status: DISCONTINUED | OUTPATIENT
Start: 2022-09-30 | End: 2022-09-30 | Stop reason: HOSPADM

## 2022-09-30 RX ORDER — SODIUM CHLORIDE 0.9 % (FLUSH) 0.9 %
5-40 SYRINGE (ML) INJECTION PRN
Status: DISCONTINUED | OUTPATIENT
Start: 2022-09-30 | End: 2022-09-30 | Stop reason: HOSPADM

## 2022-09-30 RX ORDER — PROPOFOL 10 MG/ML
INJECTION, EMULSION INTRAVENOUS PRN
Status: DISCONTINUED | OUTPATIENT
Start: 2022-09-30 | End: 2022-09-30 | Stop reason: SDUPTHER

## 2022-09-30 RX ADMIN — PROPOFOL 40 MG: 10 INJECTION, EMULSION INTRAVENOUS at 10:50

## 2022-09-30 RX ADMIN — SODIUM CHLORIDE: 9 INJECTION, SOLUTION INTRAVENOUS at 09:45

## 2022-09-30 RX ADMIN — PROPOFOL 40 MG: 10 INJECTION, EMULSION INTRAVENOUS at 10:45

## 2022-09-30 RX ADMIN — PROPOFOL 80 MG: 10 INJECTION, EMULSION INTRAVENOUS at 10:39

## 2022-09-30 RX ADMIN — LIDOCAINE HYDROCHLORIDE 100 MG: 20 INJECTION, SOLUTION EPIDURAL; INFILTRATION; INTRACAUDAL; PERINEURAL at 10:39

## 2022-09-30 ASSESSMENT — PAIN - FUNCTIONAL ASSESSMENT: PAIN_FUNCTIONAL_ASSESSMENT: NONE - DENIES PAIN

## 2022-09-30 NOTE — H&P
[facility]   Pre-operative History and Physical    Patient: Verna Oquendo  : 1970  Acct#: [de-identified]    History Obtained From: electronic medical record    HISTORY OF PRESENT ILLNESS  Procedure:Colonoscopy  Indications:screeing  Past Medical History:        Diagnosis Date    Chronic diarrhea     Depression     Insomnia     Liver enzyme elevation     Type II or unspecified type diabetes mellitus without mention of complication, not stated as uncontrolled      Past Surgical History:        Procedure Laterality Date    BREAST REDUCTION SURGERY      CHOLECYSTECTOMY      COLONOSCOPY      ESOPHAGEAL DILATATION N/A 2019    ESOPHAGEAL DILATION [place] performed by Katja Baca MD at [address] [place] [address] ENDOSCOPY N/A 2019    EGD BIOPSY performed by Katja Baca MD at [facility] ENDOSCOPY     Medications prior to admission:   Prior to Admission medications    Medication Sig Start Date End Date Taking? Authorizing Provider   TRULICITY 1.5 DM/7.9TF SOPN ADMINISTER 1.5 MG UNDER THE SKIN WEEKLY 22   Roseline Matute MD   Insulin Pen Needle (B-D ULTRAFINE III SHORT PEN) 31G X 8 MM MISC USE DAILY AS DIRECTED 22   Roseline Matute MD   LANTUS SOLOSTAR 100 UNIT/ML injection pen ADMINISTER 48 UNITS UNDER THE SKIN DAILY  Patient taking differently: ADMINISTER 52 UNITS UNDER THE SKIN DAILY 22   Roseline Matute MD   lisinopril (PRINIVIL;ZESTRIL) 10 MG tablet TAKE 1 TABLET BY MOUTH DAILY 22   Roseline Matute MD   metFORMIN (GLUCOPHAGE-XR) 500 MG extended release tablet TAKE 2 TABLETS BY MOUTH BEFORE BREAKFAST AND DINNER 3/15/22   Roseline Matute MD   FARXIGA 5 MG tablet TAKE 1 TABLET BY MOUTH EVERY MORNING 3/1/22   Roseline Matute MD   blood glucose test strips (ACCU-CHEK GUIDE) strip 1 each by In Vitro route daily As needed.  21   Roseline Matute MD   Blood Glucose Monitoring Suppl ([place]) w/Device KIT As needed 12/30/21   Latha Naranjo MD   blood glucose test strips (ACCU-CHEK LUCIE) strip 1 each by In Vitro route daily As needed. 8/3/20   Latha Naranjo MD   Alpha-Lipoic Acid 600 MG TABS Take 600 mg of ampicillin by mouth daily    Historical Provider, MD   ACCU-CHEK FASTCLIX LANCETS MISC 1-2 times a day 2/21/19   Latha Naranjo MD   Multiple Vitamins-Minerals (MULTIPLE VITAMINS/WOMENS PO) Take  by mouth daily. Historical Provider, MD   aspirin 81 MG tablet Take 81 mg by mouth daily.     Historical Provider, MD   glucose blood VI test strips (TRUETEST TEST) strip Pt tests bid 3/4/14   M Eva Russ MD     Allergies:   Metformin and related [metformin and related]    Social History     Socioeconomic History    Marital status:      Spouse name: Not on file    Number of children: Not on file    Years of education: Not on file    Highest education level: Not on file   Occupational History    Not on file   Tobacco Use    Smoking status: Never    Smokeless tobacco: Never   Vaping Use    Vaping Use: Never used   Substance and Sexual Activity    Alcohol use: Not Currently     Comment: rare    Drug use: No    Sexual activity: Not on file   Other Topics Concern    Not on file   Social History Narrative    Not on file     Social Determinants of Health     Financial Resource Strain: Not on file   Food Insecurity: Not on file   Transportation Needs: Not on file   Physical Activity: Not on file   Stress: Not on file   Social Connections: Not on file   Intimate Partner Violence: Not on file   Housing Stability: Not on file     Family History   Problem Relation Age of Onset    Heart Disease Other     Diabetes Maternal Grandmother     Cancer Maternal Grandmother         breast     Diabetes Mother          PHYSICAL EXAM:      /78   Pulse 88   Temp 97.2 °F (36.2 °C) (Temporal)   Resp 18   Ht 5' 4\" (1.626 m)   Wt 190 lb (86.2 kg)   SpO2 98%   BMI 32.61 kg/m²  I Heart:normal    Lungs: normal    Abdomen: normal      ASA Grade:  See anesthesia note      ASSESSMENT AND PLAN:    1. Procedure options, risks and benefits reviewed with patient and expresses understanding.

## 2022-09-30 NOTE — ANESTHESIA PRE PROCEDURE
Department of Anesthesiology  Preprocedure Note       Name:  Tamika Sage   Age:  47 y.o.  :  1970                                          MRN:  [id number]         Date:  2022      Surgeon: Lonna Frankel):  Chris Hart MD    Procedure: Procedure(s):  COLORECTAL CANCER SCREENING, NOT HIGH RISK    Medications prior to admission:   Prior to Admission medications    Medication Sig Start Date End Date Taking? Authorizing Provider   TRULICITY 1.5 HJ/9.7WT SOPN ADMINISTER 1.5 MG UNDER THE SKIN WEEKLY 22   Vivienne Comer MD   Insulin Pen Needle (B-D ULTRAFINE III SHORT PEN) 31G X 8 MM MISC USE DAILY AS DIRECTED 22   Vivienne Comer MD   LANTUS SOLOSTAR 100 UNIT/ML injection pen ADMINISTER 48 UNITS UNDER THE SKIN DAILY  Patient taking differently: ADMINISTER 52 UNITS UNDER THE SKIN DAILY 22   Vivienne Comer MD   lisinopril (PRINIVIL;ZESTRIL) 10 MG tablet TAKE 1 TABLET BY MOUTH DAILY 22   Vivienne Comer MD   metFORMIN (GLUCOPHAGE-XR) 500 MG extended release tablet TAKE 2 TABLETS BY MOUTH BEFORE BREAKFAST AND DINNER 3/15/22   Vivienne Comer MD   FARXIGA 5 MG tablet TAKE 1 TABLET BY MOUTH EVERY MORNING 3/1/22   Vivienne Comer MD   blood glucose test strips (ACCU-CHEK GUIDE) strip 1 each by In Vitro route daily As needed. 21   Vivienne Comer MD   Blood Glucose Monitoring Suppl (ACCU-CHEK GUIDE) w/Device KIT As needed 21   Vivienne Comer MD   blood glucose test strips (ACCU-CHEK LUCIE) strip 1 each by In Vitro route daily As needed. 8/3/20   Vivienne Comer MD   Alpha-Lipoic Acid 600 MG TABS Take 600 mg of ampicillin by mouth daily    Historical Provider, MD   ACCU-CHEK FASTCLIX LANCETS MISC 1-2 times a day 19   Vivienne Comer MD   Multiple Vitamins-Minerals (MULTIPLE VITAMINS/WOMENS PO) Take  by mouth daily. Historical Provider, MD   aspirin 81 MG tablet Take 81 mg by mouth daily.     Historical Provider, MD   glucose blood VI test strips (TRUETEST TEST) strip Pt tests bid 3/4/14   Ryan Goetz MD       Current medications:    Current Facility-Administered Medications   Medication Dose Route Frequency Provider Last Rate Last Admin    sodium chloride flush 0.9 % injection 5-40 mL  5-40 mL IntraVENous 2 times per day Tristan Cantu MD        sodium chloride flush 0.9 % injection 5-40 mL  5-40 mL IntraVENous PRN Tristan Cantu MD        0.9 % sodium chloride infusion   IntraVENous PRN Tristan Cantu MD           Allergies:     Allergies   Allergen Reactions    Metformin And Related [Metformin And Related]      Diarrhea- no issues with extended release       Problem List:    Patient Active Problem List   Diagnosis Code    Esophageal reflux K21.9    Anxiety state F41.1    Diabetes mellitus, insulin dependent (IDDM), controlled [id number]       Past Medical History:        Diagnosis Date    Chronic diarrhea     Depression     Insomnia     Liver enzyme elevation     Type II or unspecified type diabetes mellitus without mention of complication, not stated as uncontrolled        Past Surgical History:        Procedure Laterality Date    BREAST REDUCTION SURGERY  1990    CHOLECYSTECTOMY      COLONOSCOPY      ESOPHAGEAL DILATATION N/A 08/14/2019    ESOPHAGEAL DILATION [place] performed by Anya Capellan MD at [address] ENDOSCOPY N/A 08/14/2019    EGD BIOPSY performed by Anya Capellan MD at [address] [facility] History:    Social History     Tobacco Use    Smoking status: Never    Smokeless tobacco: Never   Substance Use Topics    Alcohol use: Not Currently     Comment: rare                                Counseling given: Not Answered      Vital Signs (Current):   Vitals:    09/23/22 1250   Weight: 190 lb (86.2 kg)   Height: 5' 4\" (1.626 m)                                              BP Readings from Last 3 Encounters:   09/01/22 118/76   04/28/22 118/64   12/30/21 100/60       NPO Status:                                                                                 BMI:   Wt Readings from Last 3 Encounters:   09/23/22 190 lb (86.2 kg)   09/01/22 193 lb 9.6 oz (87.8 kg)   04/28/22 191 lb 12.8 oz (87 kg)     Body mass index is 32.61 kg/m². CBC:   Lab Results   Component Value Date/Time    WBC 7.6 12/17/2012 10:15 AM    RBC 4.65 12/17/2012 10:15 AM    HGB 13.7 12/17/2012 10:15 AM    HCT 40.9 12/17/2012 10:15 AM    MCV 87.9 12/17/2012 10:15 AM    RDW 13.2 12/17/2012 10:15 AM     12/17/2012 10:15 AM       CMP:   Lab Results   Component Value Date/Time     12/21/2020 10:50 AM    K 4.6 12/21/2020 10:50 AM     12/21/2020 10:50 AM    CO2 25 12/21/2020 10:50 AM    BUN 16 12/21/2020 10:50 AM    CREATININE 0.5 12/21/2020 10:50 AM    GFRAA >60 12/21/2020 10:50 AM    GFRAA >60 12/17/2012 10:15 AM    AGRATIO 1.7 12/21/2020 10:50 AM    LABGLOM >60 12/21/2020 10:50 AM    GLUCOSE 95 12/21/2020 10:50 AM    PROT 6.9 12/21/2020 10:50 AM    PROT 7.4 12/17/2012 10:15 AM    CALCIUM 9.4 12/21/2020 10:50 AM    BILITOT 0.3 12/21/2020 10:50 AM    ALKPHOS 81 12/21/2020 10:50 AM    AST 19 12/21/2020 10:50 AM    ALT 36 12/21/2020 10:50 AM       POC Tests: No results for input(s): POCGLU, POCNA, POCK, POCCL, POCBUN, POCHEMO, POCHCT in the last 72 hours.     Coags: No results found for: PROTIME, INR, APTT    HCG (If Applicable):   Lab Results   Component Value Date    PREGTESTUR Negative 08/14/2019        ABGs: No results found for: PHART, PO2ART, AVN9ZKP, ICV9HBO, BEART, Y7TRYIUQ     Type & Screen (If Applicable):  No results found for: LABABO, LABRH    Drug/Infectious Status (If Applicable):  No results found for: HIV, HEPCAB    COVID-19 Screening (If Applicable): No results found for: COVID19        Anesthesia Evaluation   no history of anesthetic complications:   Airway: Mallampati: II  TM distance: >3 FB   Neck ROM: full  Mouth opening: > = 3 FB   Dental: normal exam Pulmonary:       (-) COPD, asthma, rhonchi, wheezes, rales and not a current smoker                           Cardiovascular:  Exercise tolerance: good (>4 METS),   (+) hyperlipidemia    (-) hypertension, orthopnea,  ALEGRIA, murmur, weak pulses and peripheral edema      Rhythm: regular  Rate: normal                    Neuro/Psych:   (+) depression/anxiety    (-) seizures, TIA and CVA           GI/Hepatic/Renal:   (+) GERD:, bowel prep,      (-) liver disease, no renal disease and no morbid obesity      ROS comment: + Dysphagia s/p esophageal dilation. Endo/Other:    (+) Diabetes (HgbA1c: 6.5-6.8%)Type II DM, using insulin, .    (-) blood dyscrasia               Abdominal:   (+) obese,     Abdomen: soft. PE comment: protuberant   Vascular: Other Findings: Reports difficult IV placement in past. PIV to be placed. Anesthesia Plan      MAC     ASA 2     (NPO appropriate. Ms. Magno Christine denies active nausea / reflux.)        Anesthetic plan and risks discussed with patient. Plan discussed with CRNA. This pre-anesthesia assessment may be used as a history and physical.    DOS STAFF ADDENDUM:    Pt seen and examined, chart reviewed (including anesthesia, drug and allergy history). No interval changes to history and physical examination. Anesthetic plan, risks, benefits, alternatives, and personnel involved discussed with patient. Patient verbalized an understanding and agrees to proceed.       Fanta Spencer MD  September 30, 2022  9:37 AM

## 2022-09-30 NOTE — DISCHARGE INSTRUCTIONS
[facility] MOB Discharge Instructions  Colonoscopy    NAME:  Ro Sheehan  YOB: 1970  MEDICAL RECORD NUMBER:  [id number]  DATE:  9/30/2022      After receiving Propofol (Diprivan) for Moderate Sedation:    Do not drive or operate any machinery until tomorrow  Do not sign any legal documents or make any critical decisions  Do not drink alcoholic beverages for 24 hours  Plan to spend a few hours resting before resuming your normal routine  Possible side effects are light headedness and sedation    You may resume your usual diet at home    Resume all your daily medications    Call your physician if any of the following occur:    Severe abdominal distention and/or pain. (Mild distention or cramping is normal after this procedure; this should improve within an hour or two with passage of air)  Fever, chills, nausea or vomiting  You may notice a small amount of blood in your next few bowel movements    If excessive bleeding occurs:  Call your physician immediately or proceed to the nearest Emergency Room    Biopsy Obtained: NO      Recommendations: Repeat colonoscopy in 10 years         For questions or concerns please contact your GI physician's 24 hour call center at [phone].

## 2022-09-30 NOTE — PROCEDURES
[facility]  Endoscopy Note    Patient: Paddy Ward  : 1970  Acct#: [de-identified]    Procedure: Colonoscopy     Date:  2022    Surgeon:  Mckenzie Sanders MD, MD    Referring Physician:  Shama Espinoza    Previous Colonoscopy: Yes  Date: unknown  Greater than 3 years? Yes    Preoperative Diagnosis:  screening    Postoperative Diagnosis:  Normal colon    Anesthesia:  See anesthesia note    Indications: This is a 47y.o. year old female who presents today with screening for colon cancer. Procedure: An informed consent was obtained from the patient after explanation of indications, benefits, possible risks and complications of the procedure. The patient was then taken to the endoscopy suite, placed in the left lateral decubitus position, and the above IV anesthesia was administered. A digital rectal examination was performed and revealed negative without mass, lesions or tenderness. The Olympus CFQ-180-AL video colonoscope was placed in the patient's rectum under digital direction and advanced to the cecum. The cecum was identified by characteristic anatomy and ballottment. The ileocecal valve was identified. The preparation was excellent. The scope was then withdrawn back through the cecum, ascending, transverse, descending and sigmoid colons. Carefull circumferential examination of the mucosa in these areas demonstrated normal colonic mucosa throughout. The scope was then withdrawn into the rectum and retroflexed. The retroflexed view of the anal verge and rectum demonstrates no abnormalities. The scope was straightened, the colon was decompressed and the scope was withdrawn from the patient. The patient tolerated the procedure well and was taken to the PACU in good condition. Estimated Blood Loss:  none    Impression:  Normal colon    Recommendations:  Repeat colonoscopy in 10 years.     Mckenzie Sanders MD, MD   [facility]  2022

## 2022-09-30 NOTE — ANESTHESIA POSTPROCEDURE EVALUATION
Department of Anesthesiology  Postprocedure Note    Patient: Latoya Jeffries  MRN: [id number]  YOB: 1970  Date of evaluation: 9/30/2022      Procedure Summary     Date: 09/30/22 Room / Location: [address]    Anesthesia Start: 3320 Anesthesia Stop: 2533    Procedure: COLORECTAL CANCER SCREENING, NOT HIGH RISK Diagnosis:       Screen for colon cancer      (Screen for colon cancer)    Surgeons: Nimo Winslow MD Responsible Provider: Josh Hansen MD    Anesthesia Type: MAC ASA Status: 2          Anesthesia Type: No value filed. Mignon Phase I: Mignon Score: 10    Mignon Phase II:        Anesthesia Post Evaluation    Patient location during evaluation: PACU  Patient participation: complete - patient participated  Level of consciousness: awake and alert  Airway patency: patent  Nausea & Vomiting: no nausea and no vomiting  Complications: no  Cardiovascular status: hemodynamically stable and blood pressure returned to baseline  Respiratory status: spontaneous ventilation, nonlabored ventilation and room air  Hydration status: stable  Comments: Uneventful MAC anesthetic. Ms. Skuhjinder Ellington was seen comfortably conversing with staff following procedure. Ten year follow-up per Dr. Rosales Peng. Tolerating clears. Appropriate for discharge home with , who is present at bedside.

## 2022-10-17 NOTE — TELEPHONE ENCOUNTER
Please confirm Lantus dose before sending.      Medication:   Requested Prescriptions     Pending Prescriptions Disp Refills    LANTUS SOLOSTAR 100 UNIT/ML injection pen [Pharmacy Med Name: Donato Demarco PEN INJ 3ML] 15 mL 3     Sig: ADMINISTER 48 UNITS UNDER THE SKIN DAILY       Last Filled:      Patient Phone Number: [phone] (home)     Last appt: 9/1/22   Next appt: 12/22/22  Last Labs DM:   Lab Results   Component Value Date/Time    LABA1C 6.8 09/01/2022 05:00 PM

## 2022-10-18 RX ORDER — INSULIN GLARGINE 100 [IU]/ML
INJECTION, SOLUTION SUBCUTANEOUS
Qty: 15 ML | Refills: 3 | Status: SHIPPED | OUTPATIENT
Start: 2022-10-18

## 2022-12-09 RX ORDER — DAPAGLIFLOZIN 5 MG/1
5 TABLET, FILM COATED ORAL EVERY MORNING
Qty: 90 TABLET | Refills: 0 | Status: SHIPPED | OUTPATIENT
Start: 2022-12-09

## 2022-12-09 NOTE — TELEPHONE ENCOUNTER
Medication:   Requested Prescriptions     Pending Prescriptions Disp Refills    FARXIGA 5 MG tablet [Pharmacy Med Name: Moo Thacker 5MG TABLETS] 90 tablet 3     Sig: TAKE 1 TABLET BY MOUTH EVERY MORNING       Last Filled:      Patient Phone Number: [phone] (home)     Last appt: 9/1/22  Next appt: 12/22/22    Last Labs DM:   Lab Results   Component Value Date/Time    LABA1C 6.8 09/01/2022 05:00 PM

## 2022-12-20 DIAGNOSIS — Z79.4 CONTROLLED TYPE 2 DIABETES MELLITUS WITHOUT COMPLICATION, WITH LONG-TERM CURRENT USE OF INSULIN (HCC): ICD-10-CM

## 2022-12-20 DIAGNOSIS — E11.9 CONTROLLED TYPE 2 DIABETES MELLITUS WITHOUT COMPLICATION, WITH LONG-TERM CURRENT USE OF INSULIN (HCC): ICD-10-CM

## 2022-12-20 NOTE — TELEPHONE ENCOUNTER
Medication:   Requested Prescriptions     Pending Prescriptions Disp Refills    TRULICITY 1.5 JA/4.2HA SC injection [Pharmacy Med Name: Layla Telugu 9.7XM/9.0KQ SDP 0.5ML] 2 mL 3     Sig: ADMINISTER 1.5 MG UNDER THE SKIN WEEKLY       Last Filled:      Patient Phone Number: [phone] (home)     Last appt: 9/1/2022   Next appt: 12/22/2022    Last Labs DM:   Lab Results   Component Value Date/Time    LABA1C 6.8 09/01/2022 05:00 PM

## 2022-12-21 RX ORDER — METFORMIN HYDROCHLORIDE 500 MG/1
TABLET, EXTENDED RELEASE ORAL
Qty: 360 TABLET | Refills: 3 | OUTPATIENT
Start: 2022-12-21

## 2022-12-21 RX ORDER — DULAGLUTIDE 1.5 MG/.5ML
INJECTION, SOLUTION SUBCUTANEOUS
Qty: 2 ML | Refills: 3 | Status: SHIPPED | OUTPATIENT
Start: 2022-12-21

## 2022-12-22 ENCOUNTER — OFFICE VISIT (OUTPATIENT)
Dept: ENDOCRINOLOGY | Age: 52
End: 2022-12-22
Payer: COMMERCIAL

## 2022-12-22 VITALS
OXYGEN SATURATION: 100 % | BODY MASS INDEX: 33.46 KG/M2 | HEIGHT: 64 IN | DIASTOLIC BLOOD PRESSURE: 76 MMHG | WEIGHT: 196 LBS | SYSTOLIC BLOOD PRESSURE: 126 MMHG | HEART RATE: 99 BPM

## 2022-12-22 DIAGNOSIS — Z79.4 CONTROLLED TYPE 2 DIABETES MELLITUS WITHOUT COMPLICATION, WITH LONG-TERM CURRENT USE OF INSULIN (HCC): Primary | ICD-10-CM

## 2022-12-22 DIAGNOSIS — E11.9 CONTROLLED TYPE 2 DIABETES MELLITUS WITHOUT COMPLICATION, WITH LONG-TERM CURRENT USE OF INSULIN (HCC): Primary | ICD-10-CM

## 2022-12-22 LAB
CREATININE URINE: 58.4 MG/DL (ref 28–259)
HBA1C MFR BLD: 6.9 %
MICROALBUMIN UR-MCNC: <1.2 MG/DL
MICROALBUMIN/CREAT UR-RTO: NORMAL MG/G (ref 0–30)

## 2022-12-22 PROCEDURE — 3044F HG A1C LEVEL LT 7.0%: CPT | Performed by: INTERNAL MEDICINE

## 2022-12-22 PROCEDURE — 83036 HEMOGLOBIN GLYCOSYLATED A1C: CPT | Performed by: INTERNAL MEDICINE

## 2022-12-22 PROCEDURE — 99214 OFFICE O/P EST MOD 30 MIN: CPT | Performed by: INTERNAL MEDICINE

## 2022-12-22 RX ORDER — METFORMIN HYDROCHLORIDE 500 MG/1
TABLET, EXTENDED RELEASE ORAL
Qty: 360 TABLET | Refills: 3 | Status: SHIPPED | OUTPATIENT
Start: 2022-12-22

## 2022-12-22 NOTE — PROGRESS NOTES
Seen as f/u patient for diabetes      Interim:     Reports fluctuating glucose      Diagnosed with Type 2 diabetes mellitus in  2010  Known diabetic complications: neuropathy    Current diabetic medications   Metformin ER 2 BID  Lantus 52 units    Farxiga 5mg  trulicity 1.5    She has tried Leticia Ally in the past.    Intolerance to diabetes medications: yes - Metformin plain form    Mild, controlled    Last A1c 6.9%<-----6.8% <----6.5%<-----6.2%<-----7.2%<----6.6% <---- 6.9%<------ 6.4%<----- 6.3%<---- 6.2% on 4/17<---- 6.4 on 4/16<------5.8 on 9/15<---- 5.7 on 3/15<---6.0 on 8/14<-----6.5 on 2/14<---8.0 on 11/13<---9.5 on 7/13<--- 9.0<--- 6.5    Prior visit with dietician: Yes   Current diet: on average, 3 meals per day   Current exercise: walking   Current monitoring regimen: home blood tests -occ    Has brought blood glucose log/meter: yes  Home blood sugar records: 100s  Any episodes of hypoglycemia? occ    No Hx of CAD , PVD, CVA    Hyperlipidemia: LDL 68 o n 7/13---> LDL 66 on 5/18  LDL 84 on 12/20  LDL 84 on 8/22  Last eye exam: 11/22  Last foot exam: 12/22  Last microalbumin to creatinine ratio: 12/21  ACE-I or ARB: Lisinopril 10mg    Neuropathy improved, stable    Past Medical History:   Diagnosis Date    Chronic diarrhea     Depression     Insomnia     Liver enzyme elevation     Type II or unspecified type diabetes mellitus without mention of complication, not stated as uncontrolled      Past Surgical History:   Procedure Laterality Date    BREAST REDUCTION SURGERY  1990    CHOLECYSTECTOMY      COLONOSCOPY      COLONOSCOPY N/A 9/30/2022    COLORECTAL CANCER SCREENING, NOT HIGH RISK performed by Oli Reynaga MD at [address] [place], [place] [id number] N/A 08/14/2019    ESOPHAGEAL DILATION Zak Abisai performed by Oli Reynaga MD at [address] [facility] ENDOSCOPY N/A 08/14/2019    EGD BIOPSY performed by Oli Reynaga MD at [place] [address] Current Outpatient Medications   Medication Sig Dispense Refill    TRULICITY 1.5 GY/4.5LS SC injection ADMINISTER 1.5 MG UNDER THE SKIN WEEKLY 2 mL 3    FARXIGA 5 MG tablet TAKE 1 TABLET BY MOUTH EVERY MORNING 90 tablet 0    insulin glargine (LANTUS SOLOSTAR) 100 UNIT/ML injection pen ADMINISTER 52 UNITS UNDER THE SKIN DAILY 15 mL 3    Insulin Pen Needle (B-D ULTRAFINE III SHORT PEN) 31G X 8 MM MISC USE DAILY AS DIRECTED 100 each 5    lisinopril (PRINIVIL;ZESTRIL) 10 MG tablet TAKE 1 TABLET BY MOUTH DAILY 90 tablet 1    metFORMIN (GLUCOPHAGE-XR) 500 MG extended release tablet TAKE 2 TABLETS BY MOUTH BEFORE BREAKFAST AND DINNER 360 tablet 3    blood glucose test strips (ACCU-CHEK GUIDE) strip 1 each by In Vitro route daily As needed. 100 each 3    Blood Glucose Monitoring Suppl (ACCU-CHEK GUIDE) w/Device KIT As needed 1 kit 0    blood glucose test strips (ACCU-CHEK LUCIE) strip 1 each by In Vitro route daily As needed. 100 each 3    Alpha-Lipoic Acid 600 MG TABS Take 600 mg of ampicillin by mouth daily      ACCU-CHEK FASTCLIX LANCETS MISC 1-2 times a day 100 each 2    Multiple Vitamins-Minerals (MULTIPLE VITAMINS/WOMENS PO) Take  by mouth daily. aspirin 81 MG tablet Take 81 mg by mouth daily. glucose blood VI test strips (TRUETEST TEST) strip Pt tests bid 100 strip 12     No current facility-administered medications for this visit. Review of Systems  Scanned, reviewed    Vitals:    12/22/22 1548   BP: 126/76   Pulse: 99   SpO2: 100%       Constitutional: Well-developed, appears stated age, cooperative, in no acute distress  H/E/N/M/T:atraumatic, normocephalic, external ears, nose, lips normal without lesions  Eyes: Lids, lashes, conjunctivae and sclerae normal, No proptosis, no redness  Neck: supple, symmetrical, no swelling  Skin: No obvious rashes or lesions present.   Skin and hair texture normal  Psychiatric: Judgement and Insight:  judgement and insight appear normal  Neuro: Normal without focal findings, speech is normal normal, speech is spontaneous  Chest: No labored breathing, no chest deformity, no stridor  Musculoskeletal: No joint deformity, swelling      12/22  Skeletal foot exam is normal, no skin lesions, toenails are normal, 10 g monofilament is 8/10 on the right and 8/10 on the left     Lab Reviewed   No components found for: CHLPL  Lab Results   Component Value Date    TRIG 133 12/21/2020    TRIG 221 (H) 06/17/2020    TRIG 155 (H) 06/05/2019     Lab Results   Component Value Date    HDL 53 12/21/2020    HDL 53 06/17/2020    HDL 54 06/05/2019     Lab Results   Component Value Date    LDLCALC 84 12/21/2020    LDLCALC 51 06/17/2020    LDLCALC 70 06/05/2019     Lab Results   Component Value Date    LABVLDL 27 12/21/2020    LABVLDL 44 06/17/2020    LABVLDL 31 06/05/2019     Lab Results   Component Value Date    LABA1C 6.9 12/22/2022       Assessment:     Mariia Sosa is a 47 y.o. female with :    1.T2DM:Insulin resistant,well controlled, has early neuropathy. A1c at goal. Lantus 52 units and  trulicity  5.8HI continue same dose  2. Neuropathy: Recommend to add Neurontin but she will like to wait. Now resolved  3 :Obesity: recommend weight loss    Plan:      Lantus 52 units, advised self titration for glucose > 140   Continue metformin ER   Continue farxiga    trulicity   If cost is an issue, switch to NPH and amaryl   Advised to check blood sugar 1 times a day   Patient to send blood sugar log for titration. Advise to exercise regularly. Advise to low simple carbohydrate and protein with each  meal diet. Diabetes Care: recommended yearly eye exam, foot exam and urine microalbumin to   creatinine ratio.  Patient is up-to-date.       -Hyperlipidemia, LDL goal is <100 mg/dl   -Hypertension:Continue same  -Daily ASA:Not indicated  -Smoking status:Non smoker    F/u in 3  Months as will be seeing PCP prn    PCP: Liset Peraza

## 2023-01-13 ENCOUNTER — PATIENT MESSAGE (OUTPATIENT)
Dept: ENDOCRINOLOGY | Age: 53
End: 2023-01-13

## 2023-01-16 ENCOUNTER — TELEPHONE (OUTPATIENT)
Dept: ENDOCRINOLOGY | Age: 53
End: 2023-01-16

## 2023-01-16 NOTE — TELEPHONE ENCOUNTER
Approved today  Request Reference Number: [id number].  TRULICITY INJ 3.0/4.7 is approved through 01/16/2024

## 2023-01-16 NOTE — TELEPHONE ENCOUNTER
From: Malka Cheema  To: Dr. Torres Ban: 1/13/2023 6:34 PM EST  Subject: Trulicity    Dorothy Marquez,    I got new insurance ([employer]) and the are requiring a PA for Trulicity. [employer] also sent you something, but they told me to contact you also.     Thanks,  Aung Nunoing

## 2023-01-16 NOTE — TELEPHONE ENCOUNTER
----- Message from [address]. [place] sent at 1/13/2023  6:34 PM EST -----  Regarding: [address],    I got new insurance ([employer]) and the are requiring a PA for Trulicity. [employer] also sent you something, but they told me to contact you also.     Thanks,  BODØ

## 2023-01-17 ENCOUNTER — TELEPHONE (OUTPATIENT)
Dept: ENDOCRINOLOGY | Age: 53
End: 2023-01-17

## 2023-02-18 DIAGNOSIS — E11.9 CONTROLLED TYPE 2 DIABETES MELLITUS WITHOUT COMPLICATION, WITH LONG-TERM CURRENT USE OF INSULIN (HCC): Primary | ICD-10-CM

## 2023-02-18 DIAGNOSIS — Z79.4 CONTROLLED TYPE 2 DIABETES MELLITUS WITHOUT COMPLICATION, WITH LONG-TERM CURRENT USE OF INSULIN (HCC): Primary | ICD-10-CM

## 2023-02-20 ENCOUNTER — HOSPITAL ENCOUNTER (OUTPATIENT)
Dept: WOMENS IMAGING | Age: 53
Discharge: HOME | End: 2023-02-20
Payer: COMMERCIAL

## 2023-02-20 DIAGNOSIS — Z12.31 BREAST CANCER SCREENING BY MAMMOGRAM: ICD-10-CM

## 2023-02-20 PROCEDURE — 77067 SCR MAMMO BI INCL CAD: CPT

## 2023-02-20 RX ORDER — INSULIN GLARGINE 100 [IU]/ML
INJECTION, SOLUTION SUBCUTANEOUS
Qty: 15 ML | Refills: 3 | Status: SHIPPED | OUTPATIENT
Start: 2023-02-20

## 2023-02-20 NOTE — TELEPHONE ENCOUNTER
Medication:   Requested Prescriptions     Pending Prescriptions Disp Refills    LANTUS SOLOSTAR 100 UNIT/ML injection pen [Pharmacy Med Name: Ivan Guerrero PEN INJ 3ML] 15 mL 3     Sig: ADMINISTER 52 UNITS UNDER THE SKIN DAILY       Last Filled:      Patient Phone Number: [phone] (home)     Last appt: 12/22/2022   Next appt: 5/4/2023    Last Labs DM:   Lab Results   Component Value Date/Time    LABA1C 6.9 12/22/2022 04:01 PM

## 2023-03-14 ENCOUNTER — PATIENT MESSAGE (OUTPATIENT)
Dept: ENDOCRINOLOGY | Age: 53
End: 2023-03-14

## 2023-03-14 ENCOUNTER — TELEPHONE (OUTPATIENT)
Dept: ENDOCRINOLOGY | Age: 53
End: 2023-03-14

## 2023-03-14 NOTE — TELEPHONE ENCOUNTER
From: Keith Holland  To: Dr. Nelida Green: 3/14/2023 10:42 AM EDT  Subject: Darrell Scott,    My new insurance is denying my Farxiga prescription without a prior authorization. Can that please be sent to the pharmacy?     Thanks,  Malu Elizalde

## 2023-03-14 NOTE — TELEPHONE ENCOUNTER
Beata Ng Key: [id number] - PA Case ID: [id number] - Rx #: [id number]  Outcome  Approvedtoday  Request Reference Number: [id number]. FARXIGA TAB 5MG is approved through 03/14/2024. Your patient may now fill this prescription and it will be covered.

## 2023-03-20 RX ORDER — DAPAGLIFLOZIN 5 MG/1
5 TABLET, FILM COATED ORAL EVERY MORNING
Qty: 90 TABLET | Refills: 0 | Status: SHIPPED | OUTPATIENT
Start: 2023-03-20

## 2023-03-20 NOTE — TELEPHONE ENCOUNTER
Medication:   Requested Prescriptions     Pending Prescriptions Disp Refills    FARXIGA 5 MG tablet [Pharmacy Med Name: Latha Heath 5MG TABLETS] 90 tablet 0     Sig: TAKE 1 TABLET BY MOUTH EVERY MORNING       Last Filled:      Patient Phone Number: [phone] (home)     Last appt: 12/22/2022   Next appt: 5/4/2023    Last Labs DM:   Lab Results   Component Value Date/Time    LABA1C 6.9 12/22/2022 04:01 PM

## 2023-05-04 ENCOUNTER — OFFICE VISIT (OUTPATIENT)
Dept: ENDOCRINOLOGY | Age: 53
End: 2023-05-04
Payer: COMMERCIAL

## 2023-05-04 VITALS
HEART RATE: 100 BPM | HEIGHT: 64 IN | TEMPERATURE: 97 F | SYSTOLIC BLOOD PRESSURE: 124 MMHG | OXYGEN SATURATION: 97 % | BODY MASS INDEX: 33.46 KG/M2 | RESPIRATION RATE: 15 BRPM | DIASTOLIC BLOOD PRESSURE: 82 MMHG | WEIGHT: 196 LBS

## 2023-05-04 DIAGNOSIS — Z79.4 CONTROLLED TYPE 2 DIABETES MELLITUS WITHOUT COMPLICATION, WITH LONG-TERM CURRENT USE OF INSULIN (HCC): Primary | ICD-10-CM

## 2023-05-04 DIAGNOSIS — E11.9 CONTROLLED TYPE 2 DIABETES MELLITUS WITHOUT COMPLICATION, WITH LONG-TERM CURRENT USE OF INSULIN (HCC): Primary | ICD-10-CM

## 2023-05-04 LAB — HBA1C MFR BLD: 6.5 %

## 2023-05-04 PROCEDURE — 99214 OFFICE O/P EST MOD 30 MIN: CPT | Performed by: INTERNAL MEDICINE

## 2023-05-04 PROCEDURE — 83036 HEMOGLOBIN GLYCOSYLATED A1C: CPT | Performed by: INTERNAL MEDICINE

## 2023-05-04 PROCEDURE — 3044F HG A1C LEVEL LT 7.0%: CPT | Performed by: INTERNAL MEDICINE

## 2023-05-04 RX ORDER — INSULIN GLARGINE 100 [IU]/ML
INJECTION, SOLUTION SUBCUTANEOUS
Qty: 30 ML | Refills: 5 | Status: SHIPPED | OUTPATIENT
Start: 2023-05-04

## 2023-05-04 RX ORDER — TIRZEPATIDE 5 MG/.5ML
5 INJECTION, SOLUTION SUBCUTANEOUS WEEKLY
Qty: 2 ML | Refills: 3 | Status: SHIPPED | OUTPATIENT
Start: 2023-05-04

## 2023-05-04 NOTE — PROGRESS NOTES
Insight:  judgement and insight appear normal  Neuro: Normal without focal findings, speech is normal normal, speech is spontaneous  Chest: No labored breathing, no chest deformity, no stridor  Musculoskeletal: No joint deformity, swelling      12/22  Skeletal foot exam is normal, no skin lesions, toenails are normal, 10 g monofilament is 8/10 on the right and 8/10 on the left     Lab Reviewed   No components found for: CHLPL  Lab Results   Component Value Date    TRIG 133 12/21/2020    TRIG 221 (H) 06/17/2020    TRIG 155 (H) 06/05/2019     Lab Results   Component Value Date    HDL 53 12/21/2020    HDL 53 06/17/2020    HDL 54 06/05/2019     Lab Results   Component Value Date    LDLCALC 84 12/21/2020    LDLCALC 51 06/17/2020    LDLCALC 70 06/05/2019     Lab Results   Component Value Date    LABVLDL 27 12/21/2020    LABVLDL 44 06/17/2020    LABVLDL 31 06/05/2019     Lab Results   Component Value Date    LABA1C 6.9 12/22/2022       Assessment:     Zoey Gregory is a 47 y.o. female with :    1.T2DM:Insulin resistant,well controlled, has early neuropathy. A1c at goal.   Fasting high, increase lantus dose  Switch trulicity to mounjaro to assess if helps with weight loss  2. Neuropathy: Recommend to add Neurontin but she will like to wait. Now resolved  3 :Obesity: recommend weight loss, normal TSH    Plan:      Lantus 56 units, advised self titration for glucose > 140   Continue metformin ER   Continue farxiga    Trulicity---> mounjaro   If cost is an issue, switch to NPH and amaryl   Advised to check blood sugar 1 times a day   Patient to send blood sugar log for titration. Advise to exercise regularly. Advise to low simple carbohydrate and protein with each  meal diet. Diabetes Care: recommended yearly eye exam, foot exam and urine microalbumin to   creatinine ratio.  Patient is up-to-date.       -Hyperlipidemia, LDL goal is <100 mg/dl   -Hypertension:Continue same  -Daily ASA:Not indicated  -Smoking status:Non

## 2023-05-05 ENCOUNTER — PATIENT MESSAGE (OUTPATIENT)
Dept: ENDOCRINOLOGY | Age: 53
End: 2023-05-05

## 2023-05-08 ENCOUNTER — TELEPHONE (OUTPATIENT)
Dept: ENDOCRINOLOGY | Age: 53
End: 2023-05-08

## 2023-05-08 NOTE — TELEPHONE ENCOUNTER
From: Vivien Oliver  To: Dr. Kenzie Londono: 5/5/2023 7:39 PM EDT  Subject: Luis Alfredo Crawford Dr. is requiring a PA for this new prescription.     Thanks,  Wes Gaitan

## 2023-05-08 NOTE — TELEPHONE ENCOUNTER
Clifton Menendez (Pettit: [id number])  Rx #: [id number]  Mounjaro 5MG/0.5ML pen-injectors  Determination  Favorable  1 hour ago  Message from Plan  Request Reference Number: [id number]. MOUNJARO INJ 5MG/0.5 is approved through 05/08/2024. Your patient may now fill this prescription and it will be covered.

## 2023-06-19 RX ORDER — DAPAGLIFLOZIN 5 MG/1
5 TABLET, FILM COATED ORAL EVERY MORNING
Qty: 90 TABLET | Refills: 3 | Status: SHIPPED | OUTPATIENT
Start: 2023-06-19

## 2023-06-19 NOTE — TELEPHONE ENCOUNTER
Medication:   Requested Prescriptions     Pending Prescriptions Disp Refills    FARXIGA 5 MG tablet [Pharmacy Med Name: Rolene Batter 5MG TABLETS] 90 tablet 3     Sig: TAKE 1 TABLET BY MOUTH EVERY MORNING       Last Filled:      Patient Phone Number: [phone] (home)     Last appt: 5/4/2023   Next appt: 9/14/2023    Last Labs DM:   Lab Results   Component Value Date/Time    LABA1C 6.5 05/04/2023 03:51 PM

## 2023-08-08 DIAGNOSIS — Z79.4 CONTROLLED TYPE 2 DIABETES MELLITUS WITHOUT COMPLICATION, WITH LONG-TERM CURRENT USE OF INSULIN (HCC): ICD-10-CM

## 2023-08-08 DIAGNOSIS — E11.9 CONTROLLED TYPE 2 DIABETES MELLITUS WITHOUT COMPLICATION, WITH LONG-TERM CURRENT USE OF INSULIN (HCC): ICD-10-CM

## 2023-08-10 RX ORDER — PEN NEEDLE, DIABETIC 31 GX5/16"
NEEDLE, DISPOSABLE MISCELLANEOUS
Qty: 100 EACH | Refills: 5 | Status: SHIPPED | OUTPATIENT
Start: 2023-08-10

## 2023-08-10 NOTE — TELEPHONE ENCOUNTER
Medication:   Requested Prescriptions     Pending Prescriptions Disp Refills    B-D ULTRAFINE III SHORT PEN 31G X 8 MM MISC [Pharmacy Med Name: B-D PEN NDL SHRT [id number](5/16) ARGELIA] 100 each 5     Sig: USE DAILY AS DIRECTED       Last Filled:      Patient Phone Number: [phone] (home)     Last appt: 5/4/2023   Next appt: 9/14/2023    Last Labs DM:   Lab Results   Component Value Date/Time    LABA1C 6.5 05/04/2023 03:51 PM

## 2023-09-11 RX ORDER — TIRZEPATIDE 5 MG/.5ML
INJECTION, SOLUTION SUBCUTANEOUS
Qty: 2 ML | Refills: 3 | Status: SHIPPED | OUTPATIENT
Start: 2023-09-11

## 2023-09-11 NOTE — TELEPHONE ENCOUNTER
Medication:   Requested Prescriptions     Pending Prescriptions Disp Refills    MOUNJARO 5 MG/0.5ML SOPN SC injection [Pharmacy Med Name: Frida Mackenzie 5MG/0.5ML PF PEN INJ] 2 mL 3     Sig: ADMINISTER 0.5 ML( 5MG) UNDER THE SKIN 1 TIME A WEEK       Last Filled:      Patient Phone Number: [phone] (home)     Last appt: 5/4/2023   Next appt: 9/14/2023    Last Labs DM:   Lab Results   Component Value Date/Time    LABA1C 6.5 05/04/2023 03:51 PM

## 2023-09-14 ENCOUNTER — OFFICE VISIT (OUTPATIENT)
Dept: ENDOCRINOLOGY | Age: 53
End: 2023-09-14
Payer: COMMERCIAL

## 2023-09-14 VITALS
HEART RATE: 100 BPM | HEIGHT: 64 IN | WEIGHT: 193 LBS | DIASTOLIC BLOOD PRESSURE: 84 MMHG | TEMPERATURE: 98 F | BODY MASS INDEX: 32.95 KG/M2 | RESPIRATION RATE: 15 BRPM | OXYGEN SATURATION: 96 % | SYSTOLIC BLOOD PRESSURE: 122 MMHG

## 2023-09-14 DIAGNOSIS — Z79.4 CONTROLLED TYPE 2 DIABETES MELLITUS WITHOUT COMPLICATION, WITH LONG-TERM CURRENT USE OF INSULIN (HCC): Primary | ICD-10-CM

## 2023-09-14 DIAGNOSIS — E11.9 CONTROLLED TYPE 2 DIABETES MELLITUS WITHOUT COMPLICATION, WITH LONG-TERM CURRENT USE OF INSULIN (HCC): Primary | ICD-10-CM

## 2023-09-14 LAB — HBA1C MFR BLD: 6.2 %

## 2023-09-14 PROCEDURE — 83036 HEMOGLOBIN GLYCOSYLATED A1C: CPT | Performed by: INTERNAL MEDICINE

## 2023-09-14 PROCEDURE — 3044F HG A1C LEVEL LT 7.0%: CPT | Performed by: INTERNAL MEDICINE

## 2023-09-14 PROCEDURE — 99214 OFFICE O/P EST MOD 30 MIN: CPT | Performed by: INTERNAL MEDICINE

## 2023-09-14 RX ORDER — TIRZEPATIDE 7.5 MG/.5ML
7.5 INJECTION, SOLUTION SUBCUTANEOUS WEEKLY
Qty: 4 ADJUSTABLE DOSE PRE-FILLED PEN SYRINGE | Refills: 4 | Status: SHIPPED | OUTPATIENT
Start: 2023-09-14

## 2023-09-14 NOTE — PROGRESS NOTES
Seen as f/u patient for diabetes      Interim:     Taking mounjaro  3 lb loss    Diagnosed with Type 2 diabetes mellitus in  2010  Known diabetic complications: neuropathy    Current diabetic medications   Metformin ER 2 BID  Lantus 64 units    Farxiga 5mg  Mounjaro 5mg    She has tried [place] in the past.    Intolerance to diabetes medications: yes - Metformin plain form    Mild, controlled    Last A1c 6.2%<---- 6.5%<----- 6.9%<-----6.8% <----6.5%<-----6.2%<-----7.2%<----6.6% <---- 6.9%<------ 6.4%<----- 6.3%<---- 6.2% on 4/17<---- 6.4 on 4/16<------5.8 on 9/15<---- 5.7 on 3/15<---6.0 on 8/14<-----6.5 on 2/14<---8.0 on 11/13<---9.5 on 7/13<--- 9.0<--- 6.5    Prior visit with dietician: Yes   Current diet: on average, 3 meals per day   Current exercise: walking   Current monitoring regimen: home blood tests -occ    Has brought blood glucose log/meter: yes  Home blood sugar records:   Any episodes of hypoglycemia? occ    No Hx of CAD , PVD, CVA    Hyperlipidemia: LDL 68 o n 7/13---> LDL 66 on 5/18  LDL 84 on 12/20  LDL 84 on 8/22  Last eye exam: 11/22  Last foot exam: 12/22  Last microalbumin to creatinine ratio: 12/22  ACE-I or ARB: Lisinopril 10mg    Neuropathy improved, stable    Past Medical History:   Diagnosis Date    Chronic diarrhea     Depression     Insomnia     Liver enzyme elevation     Type II or unspecified type diabetes mellitus without mention of complication, not stated as uncontrolled      Past Surgical History:   Procedure Laterality Date    BREAST REDUCTION SURGERY  1990    CHOLECYSTECTOMY      COLONOSCOPY      COLONOSCOPY N/A 9/30/2022    COLORECTAL CANCER SCREENING, NOT HIGH RISK performed by Davy Fay MD at [address] N/A 08/14/2019    ESOPHAGEAL DILATION Kinza Ar performed by Davy Fay MD at [facility] ENDOSCOPY N/A 08/14/2019    EGD BIOPSY performed by Davy Fay MD at

## 2023-09-18 ENCOUNTER — TELEPHONE (OUTPATIENT)
Dept: ENDOCRINOLOGY | Age: 53
End: 2023-09-18

## 2024-01-02 RX ORDER — BLOOD SUGAR DIAGNOSTIC
1 STRIP MISCELLANEOUS DAILY
Qty: 100 EACH | Refills: 3 | Status: SHIPPED | OUTPATIENT
Start: 2024-01-02

## 2024-01-02 NOTE — TELEPHONE ENCOUNTER
From: Batool James  To: Office of Dr. Ken Fitzpatrick  Sent: 1/1/2024 10:37 AM EST  Subject: Medication Renewal Request    Refills have been requested for the following medications:     blood glucose test strips (ACCU-CHEK GUIDE) strip [Dr. Ken Fitzpatrick MD]    Preferred pharmacy: [id number] [place] [address] - P [phone] - F [phone]

## 2024-01-09 RX ORDER — METFORMIN HYDROCHLORIDE 500 MG/1
TABLET, EXTENDED RELEASE ORAL
Qty: 360 TABLET | Refills: 3 | Status: SHIPPED | OUTPATIENT
Start: 2024-01-09

## 2024-01-09 NOTE — TELEPHONE ENCOUNTER
Medication:   Requested Prescriptions     Pending Prescriptions Disp Refills    metFORMIN (GLUCOPHAGE-XR) 500 MG extended release tablet [Pharmacy Med Name: METFORMIN ER 500MG 24HR TABS] 360 tablet 3     Sig: TAKE 2 TABLETS BY MOUTH BEFORE BREAKFAST AND DINNER       Last Filled:      Patient Phone Number: [phone] (home)     Last appt: 9/14/2023   Next appt: 1/25/2024    Last Labs DM:   Lab Results   Component Value Date/Time    LABA1C 6.2 09/14/2023 04:40 PM

## 2024-01-25 ENCOUNTER — OFFICE VISIT (OUTPATIENT)
Dept: ENDOCRINOLOGY | Age: 54
End: 2024-01-25
Payer: COMMERCIAL

## 2024-01-25 VITALS
TEMPERATURE: 98 F | OXYGEN SATURATION: 99 % | RESPIRATION RATE: 15 BRPM | SYSTOLIC BLOOD PRESSURE: 122 MMHG | WEIGHT: 187 LBS | HEIGHT: 64 IN | BODY MASS INDEX: 31.92 KG/M2 | DIASTOLIC BLOOD PRESSURE: 80 MMHG | HEART RATE: 102 BPM

## 2024-01-25 DIAGNOSIS — E11.9 CONTROLLED TYPE 2 DIABETES MELLITUS WITHOUT COMPLICATION, WITH LONG-TERM CURRENT USE OF INSULIN (HCC): Primary | ICD-10-CM

## 2024-01-25 DIAGNOSIS — Z79.4 CONTROLLED TYPE 2 DIABETES MELLITUS WITHOUT COMPLICATION, WITH LONG-TERM CURRENT USE OF INSULIN (HCC): Primary | ICD-10-CM

## 2024-01-25 LAB
CREAT UR-MCNC: 88.9 MG/DL (ref 28–259)
HBA1C MFR BLD: 6.2 %
MICROALBUMIN UR DL<=1MG/L-MCNC: 2.8 MG/DL
MICROALBUMIN/CREAT UR: 31.5 MG/G (ref 0–30)

## 2024-01-25 PROCEDURE — G8484 FLU IMMUNIZE NO ADMIN: HCPCS | Performed by: INTERNAL MEDICINE

## 2024-01-25 PROCEDURE — 99214 OFFICE O/P EST MOD 30 MIN: CPT | Performed by: INTERNAL MEDICINE

## 2024-01-25 PROCEDURE — 3017F COLORECTAL CA SCREEN DOC REV: CPT | Performed by: INTERNAL MEDICINE

## 2024-01-25 PROCEDURE — G8427 DOCREV CUR MEDS BY ELIG CLIN: HCPCS | Performed by: INTERNAL MEDICINE

## 2024-01-25 PROCEDURE — 2022F DILAT RTA XM EVC RTNOPTHY: CPT | Performed by: INTERNAL MEDICINE

## 2024-01-25 PROCEDURE — 3046F HEMOGLOBIN A1C LEVEL >9.0%: CPT | Performed by: INTERNAL MEDICINE

## 2024-01-25 PROCEDURE — 1036F TOBACCO NON-USER: CPT | Performed by: INTERNAL MEDICINE

## 2024-01-25 PROCEDURE — G8417 CALC BMI ABV UP PARAM F/U: HCPCS | Performed by: INTERNAL MEDICINE

## 2024-01-25 PROCEDURE — 83036 HEMOGLOBIN GLYCOSYLATED A1C: CPT | Performed by: INTERNAL MEDICINE

## 2024-01-25 RX ORDER — TIRZEPATIDE 10 MG/.5ML
INJECTION, SOLUTION SUBCUTANEOUS
Qty: 4 ADJUSTABLE DOSE PRE-FILLED PEN SYRINGE | Refills: 3 | Status: SHIPPED | OUTPATIENT
Start: 2024-01-25

## 2024-01-25 NOTE — PROGRESS NOTES
Seen as f/u patient for diabetes      Interim:     Taking mounjaro  9 lb loss    Diagnosed with Type 2 diabetes mellitus in  2010  Known diabetic complications: neuropathy    Current diabetic medications   Metformin ER 2 BID  Lantus 64 units  but now 50 units  Farxiga 5mg  Mounjaro 5mg    She has tried januvia, victoza in the past.    Intolerance to diabetes medications: yes - Metformin plain form    Mild, controlled    Last A1c 6.2%<-----6.2%<---- 6.5%<----- 6.9%<-----6.8% <----6.5%<-----6.2%<-----7.2%<----6.6% <---- 6.9%<------ 6.4%<----- 6.3%<---- 6.2% on 4/17<---- 6.4 on 4/16<------5.8 on 9/15<---- 5.7 on 3/15<---6.0 on 8/14<-----6.5 on 2/14<---8.0 on 11/13<---9.5 on 7/13<--- 9.0<--- 6.5    Prior visit with dietician: Yes   Current diet: on average, 3 meals per day   Current exercise: walking   Current monitoring regimen: home blood tests -occ    Has brought blood glucose log/meter: yes  Home blood sugar records:   Any episodes of hypoglycemia? occ    No Hx of CAD , PVD, CVA    Hyperlipidemia: LDL 68 o n 7/13---> LDL 66 on 5/18  LDL 84 on 12/20  LDL 84 on 8/22  Last eye exam: 11/22  Last foot exam: 12/22  Last microalbumin to creatinine ratio: 12/22  ACE-I or ARB: Lisinopril 10mg    Neuropathy improved, stable    Past Medical History:   Diagnosis Date    Chronic diarrhea     Depression     Insomnia     Liver enzyme elevation     Type II or unspecified type diabetes mellitus without mention of complication, not stated as uncontrolled      Past Surgical History:   Procedure Laterality Date    BREAST REDUCTION SURGERY  1990    CHOLECYSTECTOMY      COLONOSCOPY      COLONOSCOPY N/A 9/30/2022    COLORECTAL CANCER SCREENING, NOT HIGH RISK performed by Serge Duque MD at [facility] ENDOSCOPY    ESOPHAGEAL DILATATION N/A 08/14/2019    ESOPHAGEAL DILATION SUTTON performed by Serge Duque MD at [facility] ENDOSCOPY    GALLBLADDER SURGERY  1998    UPPER GASTROINTESTINAL ENDOSCOPY N/A 08/14/2019    EGD BIOPSY performed

## 2024-02-21 ENCOUNTER — HOSPITAL ENCOUNTER (OUTPATIENT)
Dept: WOMENS IMAGING | Age: 54
Discharge: HOME | End: 2024-02-21
Payer: COMMERCIAL

## 2024-02-21 VITALS — WEIGHT: 187 LBS | HEIGHT: 64 IN | BODY MASS INDEX: 31.92 KG/M2

## 2024-02-21 DIAGNOSIS — Z12.31 SCREENING MAMMOGRAM FOR BREAST CANCER: ICD-10-CM

## 2024-02-21 PROCEDURE — 77063 BREAST TOMOSYNTHESIS BI: CPT

## 2024-02-23 RX ORDER — BLOOD SUGAR DIAGNOSTIC
1 STRIP MISCELLANEOUS 3 TIMES DAILY
Qty: 100 EACH | Refills: 3 | Status: SHIPPED | OUTPATIENT
Start: 2024-02-23

## 2024-02-29 RX ORDER — TIRZEPATIDE 7.5 MG/.5ML
7.5 INJECTION, SOLUTION SUBCUTANEOUS WEEKLY
Qty: 4 ADJUSTABLE DOSE PRE-FILLED PEN SYRINGE | Refills: 3 | Status: SHIPPED | OUTPATIENT
Start: 2024-02-29

## 2024-03-01 DIAGNOSIS — E11.9 CONTROLLED TYPE 2 DIABETES MELLITUS WITHOUT COMPLICATION, WITH LONG-TERM CURRENT USE OF INSULIN (HCC): ICD-10-CM

## 2024-03-01 DIAGNOSIS — Z79.4 CONTROLLED TYPE 2 DIABETES MELLITUS WITHOUT COMPLICATION, WITH LONG-TERM CURRENT USE OF INSULIN (HCC): ICD-10-CM

## 2024-03-05 RX ORDER — INSULIN GLARGINE 100 [IU]/ML
INJECTION, SOLUTION SUBCUTANEOUS
Qty: 30 ML | Refills: 5 | Status: SHIPPED | OUTPATIENT
Start: 2024-03-05

## 2024-03-05 NOTE — TELEPHONE ENCOUNTER
Medication:   Requested Prescriptions     Pending Prescriptions Disp Refills    insulin glargine (LANTUS SOLOSTAR) 100 UNIT/ML injection pen [Pharmacy Med Name: LANTUS SOLOSTAR PEN INJ 3ML] 30 mL 5     Sig: ADMINISTER 60 TO 70 UNITS UNDER THE SKIN DAILY       Last Filled:      Patient Phone Number: [phone] (home)     Last appt: 1/25/2024   Next appt: 6/20/2024    Last Labs DM:   Lab Results   Component Value Date/Time    LABA1C 6.2 01/25/2024 04:42 PM

## 2024-03-18 RX ORDER — DAPAGLIFLOZIN 5 MG/1
5 TABLET, FILM COATED ORAL EVERY MORNING
Qty: 90 TABLET | Refills: 1 | Status: SHIPPED | OUTPATIENT
Start: 2024-03-18

## 2024-06-20 ENCOUNTER — OFFICE VISIT (OUTPATIENT)
Dept: ENDOCRINOLOGY | Age: 54
End: 2024-06-20
Payer: COMMERCIAL

## 2024-06-20 VITALS
RESPIRATION RATE: 15 BRPM | BODY MASS INDEX: 30.9 KG/M2 | WEIGHT: 181 LBS | SYSTOLIC BLOOD PRESSURE: 124 MMHG | OXYGEN SATURATION: 96 % | DIASTOLIC BLOOD PRESSURE: 80 MMHG | HEIGHT: 64 IN | HEART RATE: 81 BPM

## 2024-06-20 DIAGNOSIS — Z79.4 CONTROLLED TYPE 2 DIABETES MELLITUS WITHOUT COMPLICATION, WITH LONG-TERM CURRENT USE OF INSULIN (HCC): Primary | ICD-10-CM

## 2024-06-20 DIAGNOSIS — E11.9 CONTROLLED TYPE 2 DIABETES MELLITUS WITHOUT COMPLICATION, WITH LONG-TERM CURRENT USE OF INSULIN (HCC): Primary | ICD-10-CM

## 2024-06-20 LAB — HBA1C MFR BLD: 6.6 %

## 2024-06-20 PROCEDURE — G8417 CALC BMI ABV UP PARAM F/U: HCPCS | Performed by: INTERNAL MEDICINE

## 2024-06-20 PROCEDURE — 99214 OFFICE O/P EST MOD 30 MIN: CPT | Performed by: INTERNAL MEDICINE

## 2024-06-20 PROCEDURE — G8427 DOCREV CUR MEDS BY ELIG CLIN: HCPCS | Performed by: INTERNAL MEDICINE

## 2024-06-20 PROCEDURE — 3017F COLORECTAL CA SCREEN DOC REV: CPT | Performed by: INTERNAL MEDICINE

## 2024-06-20 PROCEDURE — 1036F TOBACCO NON-USER: CPT | Performed by: INTERNAL MEDICINE

## 2024-06-20 PROCEDURE — 2022F DILAT RTA XM EVC RTNOPTHY: CPT | Performed by: INTERNAL MEDICINE

## 2024-06-20 PROCEDURE — 83036 HEMOGLOBIN GLYCOSYLATED A1C: CPT | Performed by: INTERNAL MEDICINE

## 2024-06-20 PROCEDURE — 3044F HG A1C LEVEL LT 7.0%: CPT | Performed by: INTERNAL MEDICINE

## 2024-06-20 NOTE — PROGRESS NOTES
Seen as f/u patient for diabetes      Interim:     Taking mounjaro 7.5mg   Issues with getting supply  Could not get the 10mg   6 lb loss    Diagnosed with Type 2 diabetes mellitus in  2010  Known diabetic complications: neuropathy    Current diabetic medications   Metformin ER 2 BID  Lantus 44 units  Farxiga 5mg  Mounjaro 10 mg    She has tried januvia, victoza in the past.    Intolerance to diabetes medications: yes - Metformin plain form    Mild, controlled    Last A1c 6.6%<----6.2%<-----6.2%<---- 6.5%<----- 6.9%<-----6.8% <----6.5%<-----6.2%<-----7.2%<----6.6% <---- 6.9%<------ 6.4%<----- 6.3%<---- 6.2% on 4/17<---- 6.4 on 4/16<------5.8 on 9/15<---- 5.7 on 3/15<---6.0 on 8/14<-----6.5 on 2/14<---8.0 on 11/13<---9.5 on 7/13<--- 9.0<--- 6.5    Prior visit with dietician: Yes   Current diet: on average, 3 meals per day   Current exercise: walking   Current monitoring regimen: home blood tests -occ    Has brought blood glucose log/meter: yes  Home blood sugar records:   Any episodes of hypoglycemia? occ    No Hx of CAD , PVD, CVA    Hyperlipidemia: LDL 68 o n 7/13---> LDL 66 on 5/18  LDL 84 on 12/20  LDL 84 on 8/22  Last eye exam: 11/22  Last foot exam: 6/24  Last microalbumin to creatinine ratio: 12/22---> 31.5 on 1/24  ACE-I or ARB: Lisinopril 10mg    Neuropathy improved, stable    Past Medical History:   Diagnosis Date    Chronic diarrhea     Depression     Insomnia     Liver enzyme elevation     Type II or unspecified type diabetes mellitus without mention of complication, not stated as uncontrolled      Past Surgical History:   Procedure Laterality Date    BREAST REDUCTION SURGERY  1990    CHOLECYSTECTOMY      COLONOSCOPY      COLONOSCOPY N/A 9/30/2022    COLORECTAL CANCER SCREENING, NOT HIGH RISK performed by Serge Duque MD at [facility] ENDOSCOPY    ESOPHAGEAL DILATATION N/A 08/14/2019    ESOPHAGEAL DILATION SUTTON performed by Serge Duque MD at [facility] ENDOSCOPY    GALLBLADDER SURGERY  1998

## 2024-07-10 DIAGNOSIS — Z79.4 CONTROLLED TYPE 2 DIABETES MELLITUS WITHOUT COMPLICATION, WITH LONG-TERM CURRENT USE OF INSULIN (HCC): Primary | ICD-10-CM

## 2024-07-10 DIAGNOSIS — E11.9 CONTROLLED TYPE 2 DIABETES MELLITUS WITHOUT COMPLICATION, WITH LONG-TERM CURRENT USE OF INSULIN (HCC): Primary | ICD-10-CM

## 2024-07-10 RX ORDER — BLOOD SUGAR DIAGNOSTIC
STRIP MISCELLANEOUS
Qty: 100 STRIP | Refills: 2 | Status: SHIPPED | OUTPATIENT
Start: 2024-07-10

## 2024-07-24 DIAGNOSIS — Z79.4 CONTROLLED TYPE 2 DIABETES MELLITUS WITHOUT COMPLICATION, WITH LONG-TERM CURRENT USE OF INSULIN (HCC): Primary | ICD-10-CM

## 2024-07-24 DIAGNOSIS — E11.9 CONTROLLED TYPE 2 DIABETES MELLITUS WITHOUT COMPLICATION, WITH LONG-TERM CURRENT USE OF INSULIN (HCC): Primary | ICD-10-CM

## 2024-07-24 RX ORDER — TIRZEPATIDE 10 MG/.5ML
INJECTION, SOLUTION SUBCUTANEOUS
Qty: 2 ML | Refills: 2 | Status: SHIPPED | OUTPATIENT
Start: 2024-07-24

## 2024-07-24 NOTE — TELEPHONE ENCOUNTER
Medication:   Requested Prescriptions     Signed Prescriptions Disp Refills    Tirzepatide (MOUNJARO) 10 MG/0.5ML SOPN SC injection 2 mL 2     Sig: ADMINISTER 10 MG UNDER THE SKIN WEEKLY     Authorizing Provider: JESSE LOMELI     Ordering User: ION HATCH       Last Filled:      Patient Phone Number: [phone] (home)     Last appt: 6/20/2024   Next appt: 11/14/2024    Last Labs DM:   Lab Results   Component Value Date/Time    LABA1C 6.6 06/20/2024 04:44 PM

## 2024-09-09 ENCOUNTER — PATIENT MESSAGE (OUTPATIENT)
Dept: ENDOCRINOLOGY | Age: 54
End: 2024-09-09

## 2024-09-09 DIAGNOSIS — Z79.4 CONTROLLED TYPE 2 DIABETES MELLITUS WITHOUT COMPLICATION, WITH LONG-TERM CURRENT USE OF INSULIN (HCC): Primary | ICD-10-CM

## 2024-09-09 DIAGNOSIS — E11.9 CONTROLLED TYPE 2 DIABETES MELLITUS WITHOUT COMPLICATION, WITH LONG-TERM CURRENT USE OF INSULIN (HCC): Primary | ICD-10-CM

## 2024-09-09 RX ORDER — EMPAGLIFLOZIN 10 MG/1
10 TABLET, FILM COATED ORAL DAILY
Qty: 90 TABLET | Refills: 1 | OUTPATIENT
Start: 2024-09-09

## 2024-10-15 DIAGNOSIS — Z79.4 CONTROLLED TYPE 2 DIABETES MELLITUS WITHOUT COMPLICATION, WITH LONG-TERM CURRENT USE OF INSULIN (HCC): Primary | ICD-10-CM

## 2024-10-15 DIAGNOSIS — E11.9 CONTROLLED TYPE 2 DIABETES MELLITUS WITHOUT COMPLICATION, WITH LONG-TERM CURRENT USE OF INSULIN (HCC): Primary | ICD-10-CM

## 2024-10-15 RX ORDER — METFORMIN HCL 500 MG
TABLET, EXTENDED RELEASE 24 HR ORAL
Qty: 360 TABLET | Refills: 3 | Status: SHIPPED | OUTPATIENT
Start: 2024-10-15

## 2024-10-15 NOTE — TELEPHONE ENCOUNTER
Medication:   Requested Prescriptions     Pending Prescriptions Disp Refills    metFORMIN (GLUCOPHAGE-XR) 500 MG extended release tablet [Pharmacy Med Name: METFORMIN ER 500MG 24HR TABS] 360 tablet 3     Sig: TAKE 2 TABLETS BY MOUTH BEFORE BREAKFAST AND DINNER       Last Filled:      Patient Phone Number: [phone] (home)     Last appt: 6/20/2024   Next appt: 11/14/2024    Last Labs DM:   Lab Results   Component Value Date/Time    LABA1C 6.6 06/20/2024 04:44 PM

## 2024-10-16 DIAGNOSIS — E11.9 CONTROLLED TYPE 2 DIABETES MELLITUS WITHOUT COMPLICATION, WITH LONG-TERM CURRENT USE OF INSULIN (HCC): ICD-10-CM

## 2024-10-16 DIAGNOSIS — Z79.4 CONTROLLED TYPE 2 DIABETES MELLITUS WITHOUT COMPLICATION, WITH LONG-TERM CURRENT USE OF INSULIN (HCC): ICD-10-CM

## 2024-10-17 RX ORDER — TIRZEPATIDE 10 MG/.5ML
INJECTION, SOLUTION SUBCUTANEOUS
Qty: 2 ML | Refills: 2 | Status: SHIPPED | OUTPATIENT
Start: 2024-10-17

## 2024-10-17 NOTE — TELEPHONE ENCOUNTER
Medication:   Requested Prescriptions     Pending Prescriptions Disp Refills    MOUNJARO 10 MG/0.5ML SOPN SC injection [Pharmacy Med Name: MOUNJARO 10MG/0.5ML INJ (4 PENS)] 2 mL 2     Sig: ADMINISTER 10 MG UNDER THE SKIN WEEKLY       Last Filled:      Patient Phone Number: [phone] (home)     Last appt: 6/20/2024   Next appt: 11/14/2024    Last Labs DM:   Lab Results   Component Value Date/Time    LABA1C 6.6 06/20/2024 04:44 PM

## 2024-11-08 DIAGNOSIS — Z79.4 CONTROLLED TYPE 2 DIABETES MELLITUS WITHOUT COMPLICATION, WITH LONG-TERM CURRENT USE OF INSULIN (HCC): ICD-10-CM

## 2024-11-08 DIAGNOSIS — E11.9 CONTROLLED TYPE 2 DIABETES MELLITUS WITHOUT COMPLICATION, WITH LONG-TERM CURRENT USE OF INSULIN (HCC): ICD-10-CM

## 2024-11-08 RX ORDER — PEN NEEDLE, DIABETIC 31 GX5/16"
NEEDLE, DISPOSABLE MISCELLANEOUS
Qty: 100 EACH | Refills: 0 | Status: SHIPPED | OUTPATIENT
Start: 2024-11-08

## 2024-11-14 ENCOUNTER — OFFICE VISIT (OUTPATIENT)
Dept: ENDOCRINOLOGY | Age: 54
End: 2024-11-14
Payer: COMMERCIAL

## 2024-11-14 VITALS
OXYGEN SATURATION: 94 % | SYSTOLIC BLOOD PRESSURE: 130 MMHG | DIASTOLIC BLOOD PRESSURE: 84 MMHG | BODY MASS INDEX: 30.9 KG/M2 | RESPIRATION RATE: 15 BRPM | HEART RATE: 90 BPM | WEIGHT: 180 LBS

## 2024-11-14 DIAGNOSIS — Z79.4 CONTROLLED TYPE 2 DIABETES MELLITUS WITHOUT COMPLICATION, WITH LONG-TERM CURRENT USE OF INSULIN (HCC): Primary | ICD-10-CM

## 2024-11-14 DIAGNOSIS — E11.9 CONTROLLED TYPE 2 DIABETES MELLITUS WITHOUT COMPLICATION, WITH LONG-TERM CURRENT USE OF INSULIN (HCC): Primary | ICD-10-CM

## 2024-11-14 DIAGNOSIS — G62.9 NEUROPATHY: ICD-10-CM

## 2024-11-14 LAB — HBA1C MFR BLD: 6.1 %

## 2024-11-14 PROCEDURE — 99214 OFFICE O/P EST MOD 30 MIN: CPT | Performed by: INTERNAL MEDICINE

## 2024-11-14 PROCEDURE — G8484 FLU IMMUNIZE NO ADMIN: HCPCS | Performed by: INTERNAL MEDICINE

## 2024-11-14 PROCEDURE — 3044F HG A1C LEVEL LT 7.0%: CPT | Performed by: INTERNAL MEDICINE

## 2024-11-14 PROCEDURE — 2022F DILAT RTA XM EVC RTNOPTHY: CPT | Performed by: INTERNAL MEDICINE

## 2024-11-14 PROCEDURE — 1036F TOBACCO NON-USER: CPT | Performed by: INTERNAL MEDICINE

## 2024-11-14 PROCEDURE — 3017F COLORECTAL CA SCREEN DOC REV: CPT | Performed by: INTERNAL MEDICINE

## 2024-11-14 PROCEDURE — G8417 CALC BMI ABV UP PARAM F/U: HCPCS | Performed by: INTERNAL MEDICINE

## 2024-11-14 PROCEDURE — G8428 CUR MEDS NOT DOCUMENT: HCPCS | Performed by: INTERNAL MEDICINE

## 2024-11-14 PROCEDURE — 83036 HEMOGLOBIN GLYCOSYLATED A1C: CPT | Performed by: INTERNAL MEDICINE

## 2024-11-14 RX ORDER — INSULIN GLARGINE 100 [IU]/ML
INJECTION, SOLUTION SUBCUTANEOUS
Qty: 15 ML | Refills: 5 | Status: SHIPPED | OUTPATIENT
Start: 2024-11-14

## 2024-11-14 RX ORDER — TIRZEPATIDE 12.5 MG/.5ML
INJECTION, SOLUTION SUBCUTANEOUS
Qty: 2 ML | Refills: 5 | Status: SHIPPED | OUTPATIENT
Start: 2024-11-14

## 2024-11-14 NOTE — PROGRESS NOTES
Seen as f/u patient for diabetes      Interim:     No weight loss  Had a UTI    Diagnosed with Type 2 diabetes mellitus in  2010  Known diabetic complications: neuropathy    Current diabetic medications   Metformin ER 2 BID  Lantus 40 units  Jardiance 10mg  Mounjaro 10 mg    She has tried januvia, victoza in the past.    Intolerance to diabetes medications: yes - Metformin plain form    Mild, controlled    Last A1c 6.1%<----6.6%<----6.2%<-----6.2%<---- 6.5%<----- 6.9%<-----6.8% <----6.5%<-----6.2%<-----7.2%<----6.6% <---- 6.9%<------ 6.4%<----- 6.3%<---- 6.2% on 4/17<---- 6.4 on 4/16<------5.8 on 9/15<---- 5.7 on 3/15<---6.0 on 8/14<-----6.5 on 2/14<---8.0 on 11/13<---9.5 on 7/13<--- 9.0<--- 6.5    Prior visit with dietician: Yes   Current diet: on average, 3 meals per day   Current exercise: walking   Current monitoring regimen: home blood tests -occ    Has brought blood glucose log/meter: yes  Home blood sugar records:   Any episodes of hypoglycemia? occ    No Hx of CAD , PVD, CVA    Hyperlipidemia: LDL 68 o n 7/13---> LDL 66 on 5/18  LDL 84 on 12/20  LDL 84 on 8/22  Last eye exam: 1/24  Last foot exam: 6/24  Last microalbumin to creatinine ratio: 12/22---> 31.5 on 1/24  ACE-I or ARB: Lisinopril 10mg    Neuropathy improved, stable    Past Medical History:   Diagnosis Date    Chronic diarrhea     Depression     Insomnia     Liver enzyme elevation     Type II or unspecified type diabetes mellitus without mention of complication, not stated as uncontrolled      Past Surgical History:   Procedure Laterality Date    BREAST REDUCTION SURGERY  1990    CHOLECYSTECTOMY      COLONOSCOPY      COLONOSCOPY N/A 9/30/2022    COLORECTAL CANCER SCREENING, NOT HIGH RISK performed by Serge Duque MD at [facility] ENDOSCOPY    ESOPHAGEAL DILATATION N/A 08/14/2019    ESOPHAGEAL DILATION SUTTON performed by Serge Duque MD at [facility] ENDOSCOPY    GALLBLADDER SURGERY  1998    UPPER GASTROINTESTINAL ENDOSCOPY N/A 08/14/2019

## 2024-12-06 DIAGNOSIS — E11.9 CONTROLLED TYPE 2 DIABETES MELLITUS WITHOUT COMPLICATION, WITH LONG-TERM CURRENT USE OF INSULIN (HCC): ICD-10-CM

## 2024-12-06 DIAGNOSIS — Z79.4 CONTROLLED TYPE 2 DIABETES MELLITUS WITHOUT COMPLICATION, WITH LONG-TERM CURRENT USE OF INSULIN (HCC): ICD-10-CM

## 2024-12-06 RX ORDER — EMPAGLIFLOZIN 10 MG/1
10 TABLET, FILM COATED ORAL DAILY
Qty: 90 TABLET | Refills: 0 | Status: SHIPPED | OUTPATIENT
Start: 2024-12-06

## 2024-12-12 DIAGNOSIS — E11.9 CONTROLLED TYPE 2 DIABETES MELLITUS WITHOUT COMPLICATION, WITH LONG-TERM CURRENT USE OF INSULIN (HCC): ICD-10-CM

## 2024-12-12 DIAGNOSIS — Z79.4 CONTROLLED TYPE 2 DIABETES MELLITUS WITHOUT COMPLICATION, WITH LONG-TERM CURRENT USE OF INSULIN (HCC): ICD-10-CM

## 2024-12-12 RX ORDER — EMPAGLIFLOZIN 10 MG/1
10 TABLET, FILM COATED ORAL DAILY
Qty: 90 TABLET | Refills: 1 | Status: SHIPPED | OUTPATIENT
Start: 2024-12-12

## 2025-01-11 ENCOUNTER — PATIENT MESSAGE (OUTPATIENT)
Dept: ENDOCRINOLOGY | Age: 55
End: 2025-01-11

## 2025-01-16 ENCOUNTER — TELEPHONE (OUTPATIENT)
Dept: ENDOCRINOLOGY | Age: 55
End: 2025-01-16

## 2025-01-16 NOTE — TELEPHONE ENCOUNTER
Submitted PA for Mounjaro  [facility] Key: [id number] STATUS: PENDING.    Follow up done daily; if no decision with in three days we will refax.  If another three days goes by with no decision will call the insurance for status.

## 2025-01-21 NOTE — TELEPHONE ENCOUNTER
Approved today by Rayshawn ROBLES 2017  CaseId:[id number];Status:Approved;Review Type:Prior Auth;Coverage Start Date:01/16/2025;Coverage End Date:01/21/2026    If this requires a response please respond to the pool ( P [facility] MEDICATION PRE-AUTH).      Thank you please advise patient.

## 2025-02-04 DIAGNOSIS — Z79.4 CONTROLLED TYPE 2 DIABETES MELLITUS WITHOUT COMPLICATION, WITH LONG-TERM CURRENT USE OF INSULIN (HCC): ICD-10-CM

## 2025-02-04 DIAGNOSIS — E11.9 CONTROLLED TYPE 2 DIABETES MELLITUS WITHOUT COMPLICATION, WITH LONG-TERM CURRENT USE OF INSULIN (HCC): ICD-10-CM

## 2025-02-05 RX ORDER — PEN NEEDLE, DIABETIC 31 GX5/16"
NEEDLE, DISPOSABLE MISCELLANEOUS
Qty: 100 EACH | Refills: 3 | Status: SHIPPED | OUTPATIENT
Start: 2025-02-05

## 2025-02-05 NOTE — TELEPHONE ENCOUNTER
Medication:   Requested Prescriptions     Pending Prescriptions Disp Refills    Insulin Pen Needle (B-D ULTRAFINE III SHORT PEN) 31G X 8 MM MISC [Pharmacy Med Name: B-D PEN NDL SHRT [id number](5/16) ARGELIA] 100 each 3     Sig: USE DAILY AS DIRECTED       Last Filled:      Patient Phone Number: [phone] (home)     Last appt: 11/14/2024   Next appt: 4/17/2025    Last Labs DM:   Lab Results   Component Value Date/Time    LABA1C 6.1 11/14/2024 04:46 PM

## 2025-02-13 ENCOUNTER — PATIENT MESSAGE (OUTPATIENT)
Dept: ENDOCRINOLOGY | Age: 55
End: 2025-02-13

## 2025-02-13 DIAGNOSIS — E11.9 CONTROLLED TYPE 2 DIABETES MELLITUS WITHOUT COMPLICATION, WITH LONG-TERM CURRENT USE OF INSULIN (HCC): ICD-10-CM

## 2025-02-13 DIAGNOSIS — Z79.4 CONTROLLED TYPE 2 DIABETES MELLITUS WITHOUT COMPLICATION, WITH LONG-TERM CURRENT USE OF INSULIN (HCC): ICD-10-CM

## 2025-02-13 RX ORDER — BLOOD SUGAR DIAGNOSTIC
STRIP MISCELLANEOUS
Qty: 100 STRIP | Refills: 2 | Status: SHIPPED | OUTPATIENT
Start: 2025-02-13

## 2025-02-13 NOTE — TELEPHONE ENCOUNTER
Medication:   Requested Prescriptions     Pending Prescriptions Disp Refills    blood glucose test strips (ACCU-CHEK GUIDE) strip 100 strip 2     Sig: Use to check blood glucose 3 times daily       Last Filled:      Patient Phone Number: [phone] (home)     Last appt: 11/14/2024   Next appt: 4/17/2025    Last Labs DM:   Lab Results   Component Value Date/Time    LABA1C 6.1 11/14/2024 04:46 PM

## 2025-04-17 ENCOUNTER — OFFICE VISIT (OUTPATIENT)
Dept: ENDOCRINOLOGY | Age: 55
End: 2025-04-17
Payer: COMMERCIAL

## 2025-04-17 VITALS
SYSTOLIC BLOOD PRESSURE: 112 MMHG | DIASTOLIC BLOOD PRESSURE: 68 MMHG | HEART RATE: 84 BPM | BODY MASS INDEX: 29.33 KG/M2 | HEIGHT: 64 IN | WEIGHT: 171.8 LBS

## 2025-04-17 DIAGNOSIS — G62.9 NEUROPATHY: ICD-10-CM

## 2025-04-17 DIAGNOSIS — E11.9 CONTROLLED TYPE 2 DIABETES MELLITUS WITHOUT COMPLICATION, WITH LONG-TERM CURRENT USE OF INSULIN (HCC): ICD-10-CM

## 2025-04-17 DIAGNOSIS — Z79.4 CONTROLLED TYPE 2 DIABETES MELLITUS WITHOUT COMPLICATION, WITH LONG-TERM CURRENT USE OF INSULIN: Primary | ICD-10-CM

## 2025-04-17 DIAGNOSIS — Z79.4 CONTROLLED TYPE 2 DIABETES MELLITUS WITHOUT COMPLICATION, WITH LONG-TERM CURRENT USE OF INSULIN (HCC): ICD-10-CM

## 2025-04-17 DIAGNOSIS — E11.9 CONTROLLED TYPE 2 DIABETES MELLITUS WITHOUT COMPLICATION, WITH LONG-TERM CURRENT USE OF INSULIN: Primary | ICD-10-CM

## 2025-04-17 LAB
CREAT UR-MCNC: 104 MG/DL (ref 28–259)
HBA1C MFR BLD: 5.9 %
MICROALBUMIN UR DL<=1MG/L-MCNC: 3.65 MG/DL
MICROALBUMIN/CREAT UR: 35.1 MG/G (ref 0–30)

## 2025-04-17 PROCEDURE — 3044F HG A1C LEVEL LT 7.0%: CPT | Performed by: INTERNAL MEDICINE

## 2025-04-17 PROCEDURE — 99214 OFFICE O/P EST MOD 30 MIN: CPT | Performed by: INTERNAL MEDICINE

## 2025-04-17 PROCEDURE — 83036 HEMOGLOBIN GLYCOSYLATED A1C: CPT | Performed by: INTERNAL MEDICINE

## 2025-04-17 RX ORDER — ROSUVASTATIN CALCIUM 5 MG/1
5 TABLET, COATED ORAL DAILY
COMMUNITY

## 2025-04-17 RX ORDER — BLOOD-GLUCOSE SENSOR
EACH MISCELLANEOUS
Qty: 2 EACH | Refills: 4 | Status: SHIPPED | OUTPATIENT
Start: 2025-04-17

## 2025-04-17 NOTE — PROGRESS NOTES
Seen as f/u patient for diabetes      Interim:     9 lb weight loss      Diagnosed with Type 2 diabetes mellitus in  2010  Known diabetic complications: neuropathy    Current diabetic medications   Metformin ER 2 BID  Lantus 38 units  Jardiance 10mg  Mounjaro 12.5 mg    She has tried januvia, victoza in the past.    Intolerance to diabetes medications: yes - Metformin plain form    Mild, controlled    Last A1c 5.9%<---- 6.1%<----6.6%<----6.2%<-----6.2%<---- 6.5%<----- 6.9%<-----6.8% <----6.5%<-----6.2%<-----7.2%<----6.6% <---- 6.9%<------ 6.4%<----- 6.3%<---- 6.2% on 4/17<---- 6.4 on 4/16<------5.8 on 9/15<---- 5.7 on 3/15<---6.0 on 8/14<-----6.5 on 2/14<---8.0 on 11/13<---9.5 on 7/13<--- 9.0<--- 6.5    Prior visit with dietician: Yes   Current diet: on average, 3 meals per day   Current exercise: walking   Current monitoring regimen: home blood tests -occ    Has brought blood glucose log/meter: yes  Home blood sugar records:   Any episodes of hypoglycemia? occ    No Hx of CAD , PVD, CVA    Hyperlipidemia: LDL 68 o n 7/13---> LDL 66 on 5/18  LDL 84 on 12/20  LDL 84 on 8/22  Last eye exam: 1/24  Last foot exam: 6/24  Last microalbumin to creatinine ratio: 12/22---> 31.5 on 1/24---> 93 on 3/25  ACE-I or ARB: Lisinopril 10mg    Neuropathy improved, stable    Past Medical History:   Diagnosis Date    Chronic diarrhea     Depression     Insomnia     Liver enzyme elevation     Type II or unspecified type diabetes mellitus without mention of complication, not stated as uncontrolled      Past Surgical History:   Procedure Laterality Date    BREAST REDUCTION SURGERY  1990    CHOLECYSTECTOMY      COLONOSCOPY      COLONOSCOPY N/A 9/30/2022    COLORECTAL CANCER SCREENING, NOT HIGH RISK performed by Serge Duque MD at [facility] ENDOSCOPY    ESOPHAGEAL DILATATION N/A 08/14/2019    ESOPHAGEAL DILATION SUTTON performed by Serge Duque MD at [facility] ENDOSCOPY    GALLBLADDER SURGERY  1998    UPPER GASTROINTESTINAL ENDOSCOPY

## 2025-06-15 DIAGNOSIS — Z79.4 CONTROLLED TYPE 2 DIABETES MELLITUS WITHOUT COMPLICATION, WITH LONG-TERM CURRENT USE OF INSULIN (HCC): ICD-10-CM

## 2025-06-15 DIAGNOSIS — E11.9 CONTROLLED TYPE 2 DIABETES MELLITUS WITHOUT COMPLICATION, WITH LONG-TERM CURRENT USE OF INSULIN (HCC): ICD-10-CM

## 2025-06-16 RX ORDER — INSULIN GLARGINE 100 [IU]/ML
INJECTION, SOLUTION SUBCUTANEOUS
Qty: 15 ML | Refills: 5 | Status: SHIPPED | OUTPATIENT
Start: 2025-06-16

## 2025-06-16 NOTE — TELEPHONE ENCOUNTER
Medication:   Requested Prescriptions     Pending Prescriptions Disp Refills    insulin glargine (LANTUS SOLOSTAR) 100 UNIT/ML injection pen [Pharmacy Med Name: LANTUS SOLOSTAR PEN INJ 3ML] 15 mL 5     Sig: ADMINISTER 36 TO 46 UNITS UNDER THE SKIN DAILY       Last Filled:      Patient Phone Number: [phone] (home)     Last appt: 4/17/2025   Next appt: 8/21/2025    Last Labs DM:   Lab Results   Component Value Date/Time    LABA1C 5.9 04/17/2025 04:46 PM

## 2025-09-04 ENCOUNTER — OFFICE VISIT (OUTPATIENT)
Dept: ENDOCRINOLOGY | Age: 55
End: 2025-09-04
Payer: COMMERCIAL

## 2025-09-04 VITALS
WEIGHT: 168 LBS | DIASTOLIC BLOOD PRESSURE: 68 MMHG | BODY MASS INDEX: 28.68 KG/M2 | TEMPERATURE: 98 F | HEART RATE: 72 BPM | RESPIRATION RATE: 14 BRPM | SYSTOLIC BLOOD PRESSURE: 102 MMHG | HEIGHT: 64 IN

## 2025-09-04 DIAGNOSIS — Z79.4 CONTROLLED TYPE 2 DIABETES MELLITUS WITHOUT COMPLICATION, WITH LONG-TERM CURRENT USE OF INSULIN (HCC): ICD-10-CM

## 2025-09-04 DIAGNOSIS — E11.9 CONTROLLED TYPE 2 DIABETES MELLITUS WITHOUT COMPLICATION, WITH LONG-TERM CURRENT USE OF INSULIN (HCC): ICD-10-CM

## 2025-09-04 LAB — HBA1C MFR BLD: 5.6 %

## 2025-09-04 PROCEDURE — 95251 CONT GLUC MNTR ANALYSIS I&R: CPT | Performed by: INTERNAL MEDICINE

## 2025-09-04 PROCEDURE — 3044F HG A1C LEVEL LT 7.0%: CPT | Performed by: INTERNAL MEDICINE

## 2025-09-04 PROCEDURE — 99214 OFFICE O/P EST MOD 30 MIN: CPT | Performed by: INTERNAL MEDICINE

## 2025-09-04 PROCEDURE — 83036 HEMOGLOBIN GLYCOSYLATED A1C: CPT | Performed by: INTERNAL MEDICINE

## 2025-09-04 RX ORDER — INSULIN GLARGINE 100 [IU]/ML
INJECTION, SOLUTION SUBCUTANEOUS
Qty: 15 ML | Refills: 5 | Status: SHIPPED | OUTPATIENT
Start: 2025-09-04

## 2025-09-04 RX ORDER — HYDROCHLOROTHIAZIDE 12.5 MG/1
CAPSULE ORAL
Qty: 2 EACH | Refills: 5 | Status: SHIPPED | OUTPATIENT
Start: 2025-09-04

## (undated) DEVICE — FORCEPS BX 240CM 2.4MM L NDL RAD JAW 4 M00513334